# Patient Record
Sex: FEMALE | Race: AMERICAN INDIAN OR ALASKA NATIVE | NOT HISPANIC OR LATINO | RURAL
[De-identification: names, ages, dates, MRNs, and addresses within clinical notes are randomized per-mention and may not be internally consistent; named-entity substitution may affect disease eponyms.]

---

## 2024-05-19 ENCOUNTER — HOSPITAL ENCOUNTER (INPATIENT)
Facility: HOSPITAL | Age: 39
LOS: 4 days | Discharge: HOME | DRG: 432 | End: 2024-05-23
Attending: INTERNAL MEDICINE | Admitting: INTERNAL MEDICINE
Payer: OTHER GOVERNMENT

## 2024-05-19 DIAGNOSIS — K92.0 HEMATEMESIS: ICD-10-CM

## 2024-05-19 DIAGNOSIS — K92.2 UGIB (UPPER GASTROINTESTINAL BLEED): ICD-10-CM

## 2024-05-19 DIAGNOSIS — D61.818 PANCYTOPENIA: Primary | ICD-10-CM

## 2024-05-19 DIAGNOSIS — E11.9 TYPE 2 DIABETES MELLITUS WITHOUT COMPLICATION, WITHOUT LONG-TERM CURRENT USE OF INSULIN: ICD-10-CM

## 2024-05-19 DIAGNOSIS — I10 HYPERTENSION: ICD-10-CM

## 2024-05-19 DIAGNOSIS — I85.00 VARICES OF ESOPHAGUS DETERMINED BY ENDOSCOPY: ICD-10-CM

## 2024-05-19 LAB
ABO + RH BLD: NORMAL
ABORH RETYPE: NORMAL
ALBUMIN SERPL BCP-MCNC: 2.4 G/DL (ref 3.5–5)
ALBUMIN/GLOB SERPL: 0.7 {RATIO}
ALP SERPL-CCNC: 82 U/L (ref 37–98)
ALT SERPL W P-5'-P-CCNC: 23 U/L (ref 13–56)
ANION GAP SERPL CALCULATED.3IONS-SCNC: 7 MMOL/L (ref 7–16)
ANISOCYTOSIS BLD QL SMEAR: ABNORMAL
APTT PPP: 29.4 SECONDS (ref 25.2–37.3)
AST SERPL W P-5'-P-CCNC: 53 U/L (ref 15–37)
BASOPHILS # BLD AUTO: 0.01 K/UL (ref 0–0.2)
BASOPHILS NFR BLD AUTO: 0.3 % (ref 0–1)
BILIRUB SERPL-MCNC: 1.6 MG/DL (ref ?–1.2)
BLD PROD TYP BPU: NORMAL
BLOOD UNIT EXPIRATION DATE: NORMAL
BLOOD UNIT TYPE CODE: 5100
BUN SERPL-MCNC: 7 MG/DL (ref 7–18)
BUN/CREAT SERPL: 12 (ref 6–20)
CALCIUM SERPL-MCNC: 7.4 MG/DL (ref 8.5–10.1)
CHLORIDE SERPL-SCNC: 108 MMOL/L (ref 98–107)
CO2 SERPL-SCNC: 29 MMOL/L (ref 21–32)
CREAT SERPL-MCNC: 0.6 MG/DL (ref 0.55–1.02)
CROSSMATCH INTERPRETATION: NORMAL
DIFFERENTIAL METHOD BLD: ABNORMAL
DISPENSE STATUS: NORMAL
EGFR (NO RACE VARIABLE) (RUSH/TITUS): 117 ML/MIN/1.73M2
EOSINOPHIL # BLD AUTO: 0.01 K/UL (ref 0–0.5)
EOSINOPHIL NFR BLD AUTO: 0.3 % (ref 1–4)
ERYTHROCYTE [DISTWIDTH] IN BLOOD BY AUTOMATED COUNT: 21.9 % (ref 11.5–14.5)
FERRITIN SERPL-MCNC: 19 NG/ML (ref 8–252)
GLOBULIN SER-MCNC: 3.5 G/DL (ref 2–4)
GLUCOSE SERPL-MCNC: 144 MG/DL (ref 74–106)
GLUCOSE SERPL-MCNC: 147 MG/DL (ref 70–105)
HAPTOGLOB SERPL NEPH-MCNC: <8 MG/DL (ref 30–200)
HCG SERPL-ACNC: <1 MIU/ML
HCT VFR BLD AUTO: 20.7 % (ref 38–47)
HGB BLD-MCNC: 5.9 G/DL (ref 12–16)
HIV 1+O+2 AB SERPL QL: NORMAL
HYPOCHROMIA BLD QL SMEAR: ABNORMAL
IMM GRANULOCYTES # BLD AUTO: 0.02 K/UL (ref 0–0.04)
IMM GRANULOCYTES NFR BLD: 0.6 % (ref 0–0.4)
IMM RETICS NFR: 34.9 % (ref 3–15.9)
INDIRECT COOMBS: NORMAL
INR BLD: 1.15
IRON SATN MFR SERPL: 19 % (ref 14–50)
IRON SERPL-MCNC: 61 ΜG/DL (ref 50–170)
LYMPHOCYTES # BLD AUTO: 0.71 K/UL (ref 1–4.8)
LYMPHOCYTES NFR BLD AUTO: 21.2 % (ref 27–41)
MACROCYTES BLD QL SMEAR: ABNORMAL
MAGNESIUM SERPL-MCNC: 1.7 MG/DL (ref 1.7–2.3)
MCH RBC QN AUTO: 29.2 PG (ref 27–31)
MCHC RBC AUTO-ENTMCNC: 28.5 G/DL (ref 32–36)
MCV RBC AUTO: 102.5 FL (ref 80–96)
MONOCYTES # BLD AUTO: 0.26 K/UL (ref 0–0.8)
MONOCYTES NFR BLD AUTO: 7.8 % (ref 2–6)
MPC BLD CALC-MCNC: 10.5 FL (ref 9.4–12.4)
NEUTROPHILS # BLD AUTO: 2.34 K/UL (ref 1.8–7.7)
NEUTROPHILS NFR BLD AUTO: 69.8 % (ref 53–65)
NRBC # BLD AUTO: 0.03 X10E3/UL
NRBC, AUTO (.00): 0.9 %
PLATELET # BLD AUTO: 64 K/UL (ref 150–400)
PLATELET MORPHOLOGY: ABNORMAL
POLYCHROMASIA BLD QL SMEAR: ABNORMAL
POTASSIUM SERPL-SCNC: 3.9 MMOL/L (ref 3.5–5.1)
PROT SERPL-MCNC: 5.9 G/DL (ref 6.4–8.2)
PROTHROMBIN TIME: 14.6 SECONDS (ref 11.7–14.7)
RBC # BLD AUTO: 2.02 M/UL (ref 4.2–5.4)
RETICS # AUTO: 0.18 X10E6/UL (ref 0.02–0.11)
RETICS/RBC NFR AUTO: 8.8 % (ref 0.4–2.2)
RH BLD: NORMAL
SODIUM SERPL-SCNC: 140 MMOL/L (ref 136–145)
SPECIMEN OUTDATE: NORMAL
TIBC SERPL-MCNC: 314 ΜG/DL (ref 250–450)
TSH SERPL DL<=0.005 MIU/L-ACNC: 1.1 UIU/ML (ref 0.36–3.74)
UNIT NUMBER: NORMAL
VIT B12 SERPL-MCNC: 283 PG/ML (ref 193–986)
WBC # BLD AUTO: 3.35 K/UL (ref 4.5–11)

## 2024-05-19 PROCEDURE — 85610 PROTHROMBIN TIME: CPT | Performed by: HOSPITALIST

## 2024-05-19 PROCEDURE — C9113 INJ PANTOPRAZOLE SODIUM, VIA: HCPCS | Performed by: HOSPITALIST

## 2024-05-19 PROCEDURE — 83735 ASSAY OF MAGNESIUM: CPT | Performed by: HOSPITALIST

## 2024-05-19 PROCEDURE — 99223 1ST HOSP IP/OBS HIGH 75: CPT | Mod: ,,, | Performed by: HOSPITALIST

## 2024-05-19 PROCEDURE — 86923 COMPATIBILITY TEST ELECTRIC: CPT | Performed by: HOSPITALIST

## 2024-05-19 PROCEDURE — 85025 COMPLETE CBC W/AUTO DIFF WBC: CPT | Performed by: HOSPITALIST

## 2024-05-19 PROCEDURE — 20000000 HC ICU ROOM

## 2024-05-19 PROCEDURE — 84702 CHORIONIC GONADOTROPIN TEST: CPT | Performed by: INTERNAL MEDICINE

## 2024-05-19 PROCEDURE — 82962 GLUCOSE BLOOD TEST: CPT

## 2024-05-19 PROCEDURE — S4991 NICOTINE PATCH NONLEGEND: HCPCS | Performed by: HOSPITALIST

## 2024-05-19 PROCEDURE — 82728 ASSAY OF FERRITIN: CPT | Performed by: HOSPITALIST

## 2024-05-19 PROCEDURE — 36415 COLL VENOUS BLD VENIPUNCTURE: CPT | Performed by: INTERNAL MEDICINE

## 2024-05-19 PROCEDURE — 63600175 PHARM REV CODE 636 W HCPCS: Performed by: HOSPITALIST

## 2024-05-19 PROCEDURE — 25000003 PHARM REV CODE 250: Performed by: HOSPITALIST

## 2024-05-19 PROCEDURE — P9016 RBC LEUKOCYTES REDUCED: HCPCS | Performed by: HOSPITALIST

## 2024-05-19 PROCEDURE — 85045 AUTOMATED RETICULOCYTE COUNT: CPT | Performed by: HOSPITALIST

## 2024-05-19 PROCEDURE — 82607 VITAMIN B-12: CPT | Performed by: HOSPITALIST

## 2024-05-19 PROCEDURE — 25000003 PHARM REV CODE 250: Performed by: INTERNAL MEDICINE

## 2024-05-19 PROCEDURE — 36415 COLL VENOUS BLD VENIPUNCTURE: CPT | Performed by: HOSPITALIST

## 2024-05-19 PROCEDURE — 85730 THROMBOPLASTIN TIME PARTIAL: CPT | Performed by: HOSPITALIST

## 2024-05-19 PROCEDURE — 84443 ASSAY THYROID STIM HORMONE: CPT | Performed by: HOSPITALIST

## 2024-05-19 PROCEDURE — 93010 ELECTROCARDIOGRAM REPORT: CPT | Mod: ,,, | Performed by: HOSPITALIST

## 2024-05-19 PROCEDURE — 80053 COMPREHEN METABOLIC PANEL: CPT | Performed by: HOSPITALIST

## 2024-05-19 PROCEDURE — 93005 ELECTROCARDIOGRAM TRACING: CPT

## 2024-05-19 PROCEDURE — 83540 ASSAY OF IRON: CPT | Performed by: HOSPITALIST

## 2024-05-19 PROCEDURE — 83010 ASSAY OF HAPTOGLOBIN QUANT: CPT | Performed by: HOSPITALIST

## 2024-05-19 PROCEDURE — 83036 HEMOGLOBIN GLYCOSYLATED A1C: CPT | Mod: 90 | Performed by: INTERNAL MEDICINE

## 2024-05-19 PROCEDURE — 36430 TRANSFUSION BLD/BLD COMPNT: CPT

## 2024-05-19 PROCEDURE — 86850 RBC ANTIBODY SCREEN: CPT | Performed by: HOSPITALIST

## 2024-05-19 PROCEDURE — 87389 HIV-1 AG W/HIV-1&-2 AB AG IA: CPT | Performed by: HOSPITALIST

## 2024-05-19 RX ORDER — INSULIN ASPART 100 [IU]/ML
0-10 INJECTION, SOLUTION INTRAVENOUS; SUBCUTANEOUS EVERY 6 HOURS PRN
Status: DISCONTINUED | OUTPATIENT
Start: 2024-05-19 | End: 2024-05-23 | Stop reason: HOSPADM

## 2024-05-19 RX ORDER — TALC
6 POWDER (GRAM) TOPICAL NIGHTLY PRN
Status: DISCONTINUED | OUTPATIENT
Start: 2024-05-19 | End: 2024-05-23 | Stop reason: HOSPADM

## 2024-05-19 RX ORDER — BISACODYL 5 MG
10 TABLET, DELAYED RELEASE (ENTERIC COATED) ORAL DAILY PRN
Status: DISCONTINUED | OUTPATIENT
Start: 2024-05-19 | End: 2024-05-23 | Stop reason: HOSPADM

## 2024-05-19 RX ORDER — ACETAMINOPHEN 500 MG
1000 TABLET ORAL EVERY 6 HOURS PRN
Status: DISCONTINUED | OUTPATIENT
Start: 2024-05-19 | End: 2024-05-23 | Stop reason: HOSPADM

## 2024-05-19 RX ORDER — GUAIFENESIN AND DEXTROMETHORPHAN HYDROBROMIDE 10; 100 MG/5ML; MG/5ML
10 SYRUP ORAL EVERY 6 HOURS PRN
Status: DISCONTINUED | OUTPATIENT
Start: 2024-05-19 | End: 2024-05-23 | Stop reason: HOSPADM

## 2024-05-19 RX ORDER — PANTOPRAZOLE SODIUM 40 MG/10ML
40 INJECTION, POWDER, LYOPHILIZED, FOR SOLUTION INTRAVENOUS ONCE
Status: COMPLETED | OUTPATIENT
Start: 2024-05-19 | End: 2024-05-19

## 2024-05-19 RX ORDER — METFORMIN HYDROCHLORIDE 500 MG/1
500 TABLET ORAL 2 TIMES DAILY WITH MEALS
COMMUNITY

## 2024-05-19 RX ORDER — IBUPROFEN 200 MG
1 TABLET ORAL DAILY
Status: DISCONTINUED | OUTPATIENT
Start: 2024-05-19 | End: 2024-05-23 | Stop reason: HOSPADM

## 2024-05-19 RX ORDER — GLUCAGON 1 MG
1 KIT INJECTION
Status: DISCONTINUED | OUTPATIENT
Start: 2024-05-19 | End: 2024-05-23 | Stop reason: HOSPADM

## 2024-05-19 RX ORDER — MUPIROCIN 20 MG/G
OINTMENT TOPICAL 2 TIMES DAILY
Status: DISCONTINUED | OUTPATIENT
Start: 2024-05-19 | End: 2024-05-23 | Stop reason: HOSPADM

## 2024-05-19 RX ORDER — HYDROCODONE BITARTRATE AND ACETAMINOPHEN 500; 5 MG/1; MG/1
TABLET ORAL
Status: DISCONTINUED | OUTPATIENT
Start: 2024-05-19 | End: 2024-05-22

## 2024-05-19 RX ORDER — SIMETHICONE 80 MG
1 TABLET,CHEWABLE ORAL 3 TIMES DAILY PRN
Status: DISCONTINUED | OUTPATIENT
Start: 2024-05-19 | End: 2024-05-23 | Stop reason: HOSPADM

## 2024-05-19 RX ORDER — ONDANSETRON HYDROCHLORIDE 2 MG/ML
8 INJECTION, SOLUTION INTRAVENOUS EVERY 6 HOURS PRN
Status: DISCONTINUED | OUTPATIENT
Start: 2024-05-19 | End: 2024-05-23 | Stop reason: HOSPADM

## 2024-05-19 RX ORDER — SODIUM CHLORIDE 9 MG/ML
INJECTION, SOLUTION INTRAVENOUS CONTINUOUS
Status: DISCONTINUED | OUTPATIENT
Start: 2024-05-19 | End: 2024-05-23 | Stop reason: HOSPADM

## 2024-05-19 RX ORDER — TRAZODONE HYDROCHLORIDE 50 MG/1
50 TABLET ORAL NIGHTLY PRN
Status: DISCONTINUED | OUTPATIENT
Start: 2024-05-19 | End: 2024-05-23 | Stop reason: HOSPADM

## 2024-05-19 RX ADMIN — SODIUM CHLORIDE 8 MG/HR: 900 INJECTION INTRAVENOUS at 09:05

## 2024-05-19 RX ADMIN — NICOTINE 1 PATCH: 14 PATCH, EXTENDED RELEASE TRANSDERMAL at 10:05

## 2024-05-19 RX ADMIN — PANTOPRAZOLE SODIUM 40 MG: 40 INJECTION, POWDER, LYOPHILIZED, FOR SOLUTION INTRAVENOUS at 09:05

## 2024-05-19 RX ADMIN — SODIUM CHLORIDE: 9 INJECTION, SOLUTION INTRAVENOUS at 08:05

## 2024-05-19 RX ADMIN — SODIUM CHLORIDE: 9 INJECTION, SOLUTION INTRAVENOUS at 10:05

## 2024-05-19 RX ADMIN — TRAZODONE HYDROCHLORIDE 50 MG: 50 TABLET ORAL at 11:05

## 2024-05-19 RX ADMIN — MUPIROCIN: 20 OINTMENT TOPICAL at 09:05

## 2024-05-20 ENCOUNTER — ANESTHESIA EVENT (OUTPATIENT)
Dept: GASTROENTEROLOGY | Facility: HOSPITAL | Age: 39
DRG: 432 | End: 2024-05-20
Payer: OTHER GOVERNMENT

## 2024-05-20 PROBLEM — R79.89 ELEVATED LIVER FUNCTION TESTS: Status: ACTIVE | Noted: 2024-05-20

## 2024-05-20 LAB
ABO + RH BLD: NORMAL
BASOPHILS # BLD AUTO: 0.01 K/UL (ref 0–0.2)
BASOPHILS NFR BLD AUTO: 0.3 % (ref 0–1)
BILIRUB UR QL STRIP: NEGATIVE
BLD PROD TYP BPU: NORMAL
BLOOD UNIT EXPIRATION DATE: NORMAL
BLOOD UNIT TYPE CODE: 5100
CLARITY UR: CLEAR
COLOR UR: ABNORMAL
CROSSMATCH INTERPRETATION: NORMAL
DIFFERENTIAL METHOD BLD: ABNORMAL
DISPENSE STATUS: NORMAL
EOSINOPHIL # BLD AUTO: 0.05 K/UL (ref 0–0.5)
EOSINOPHIL NFR BLD AUTO: 1.5 % (ref 1–4)
ERYTHROCYTE [DISTWIDTH] IN BLOOD BY AUTOMATED COUNT: 20.7 % (ref 11.5–14.5)
GLUCOSE SERPL-MCNC: 134 MG/DL (ref 70–105)
GLUCOSE SERPL-MCNC: 140 MG/DL (ref 70–105)
GLUCOSE SERPL-MCNC: 157 MG/DL (ref 70–105)
GLUCOSE SERPL-MCNC: 163 MG/DL (ref 70–105)
GLUCOSE UR STRIP-MCNC: NORMAL MG/DL
HCT VFR BLD AUTO: 21.4 % (ref 38–47)
HCT VFR BLD AUTO: 27 % (ref 38–47)
HGB BLD-MCNC: 6.4 G/DL (ref 12–16)
HGB BLD-MCNC: 8 G/DL (ref 12–16)
IGA SER QL IFE: NORMAL
IGA SERPL-MCNC: 331 MG/DL (ref 61–356)
IGA UR QL IFE: NORMAL
IGG SER QL IFE: NORMAL
IGG SERPL-MCNC: 1290 MG/DL (ref 767–1590)
IGG UR QL IFE: NORMAL
IGM SER QL IFE: NORMAL
IGM SERPL-MCNC: 95 MG/DL (ref 37–286)
IGM UR QL IFE: NORMAL
IMM GRANULOCYTES # BLD AUTO: 0.03 K/UL (ref 0–0.04)
IMM GRANULOCYTES NFR BLD: 0.9 % (ref 0–0.4)
KAPPA LC SER QL ELPH: 315 (ref 170–370)
KAPPA LC SER QL IFE: NORMAL
KAPPA LC UR QL IFE: NORMAL
KAPPA LC/LAMBDA SER: 1.85 % (ref 1.35–2.65)
KETONES UR STRIP-SCNC: NEGATIVE MG/DL
LAMBDA IFE, URINE: NORMAL
LAMBDA LC SER QL ELPH: 170 (ref 90–210)
LAMBDA LC SER QL IFE: NORMAL
LDH SERPL-CCNC: 673 U/L (ref 87–241)
LEUKOCYTE ESTERASE UR QL STRIP: NEGATIVE
LYMPHOCYTES # BLD AUTO: 0.87 K/UL (ref 1–4.8)
LYMPHOCYTES NFR BLD AUTO: 26.6 % (ref 27–41)
MCH RBC QN AUTO: 29.4 PG (ref 27–31)
MCHC RBC AUTO-ENTMCNC: 29.9 G/DL (ref 32–36)
MCV RBC AUTO: 98.2 FL (ref 80–96)
MONOCYTES # BLD AUTO: 0.34 K/UL (ref 0–0.8)
MONOCYTES NFR BLD AUTO: 10.4 % (ref 2–6)
MPC BLD CALC-MCNC: 11.5 FL (ref 9.4–12.4)
NEUTROPHILS # BLD AUTO: 1.97 K/UL (ref 1.8–7.7)
NEUTROPHILS NFR BLD AUTO: 60.3 % (ref 53–65)
NITRITE UR QL STRIP: NEGATIVE
NRBC # BLD AUTO: 0.02 X10E3/UL
NRBC, AUTO (.00): 0.6 %
OHS QRS DURATION: 88 MS
OHS QTC CALCULATION: 440 MS
PATH INTERP BLD-IMP: NORMAL
PATHOLOGIST INTERP, IFE, URINE: NORMAL
PH UR STRIP: 7 PH UNITS
PLATELET # BLD AUTO: 53 K/UL (ref 150–400)
PREALB SERPL NEPH-MCNC: 9 MG/DL (ref 20–40)
PROT UR QL STRIP: NEGATIVE
RBC # BLD AUTO: 2.18 M/UL (ref 4.2–5.4)
RBC # UR STRIP: NEGATIVE /UL
SP GR UR STRIP: 1.01
UNIT NUMBER: NORMAL
UROBILINOGEN UR STRIP-ACNC: 6 MG/DL
WBC # BLD AUTO: 3.27 K/UL (ref 4.5–11)

## 2024-05-20 PROCEDURE — C9113 INJ PANTOPRAZOLE SODIUM, VIA: HCPCS | Performed by: INTERNAL MEDICINE

## 2024-05-20 PROCEDURE — 36415 COLL VENOUS BLD VENIPUNCTURE: CPT | Performed by: INTERNAL MEDICINE

## 2024-05-20 PROCEDURE — 83020 HEMOGLOBIN ELECTROPHORESIS: CPT | Performed by: HOSPITALIST

## 2024-05-20 PROCEDURE — 63600175 PHARM REV CODE 636 W HCPCS: Performed by: ANESTHESIOLOGY

## 2024-05-20 PROCEDURE — 86335 IMMUNFIX E-PHORSIS/URINE/CSF: CPT | Mod: 26,,, | Performed by: PATHOLOGY

## 2024-05-20 PROCEDURE — 86335 IMMUNFIX E-PHORSIS/URINE/CSF: CPT | Performed by: HOSPITALIST

## 2024-05-20 PROCEDURE — 84134 ASSAY OF PREALBUMIN: CPT | Performed by: HOSPITALIST

## 2024-05-20 PROCEDURE — 36430 TRANSFUSION BLD/BLD COMPNT: CPT

## 2024-05-20 PROCEDURE — D9220A PRA ANESTHESIA: Mod: ,,, | Performed by: ANESTHESIOLOGY

## 2024-05-20 PROCEDURE — 63600175 PHARM REV CODE 636 W HCPCS: Performed by: HOSPITALIST

## 2024-05-20 PROCEDURE — 63600175 PHARM REV CODE 636 W HCPCS: Mod: JZ,JG | Performed by: NURSE PRACTITIONER

## 2024-05-20 PROCEDURE — 36415 COLL VENOUS BLD VENIPUNCTURE: CPT | Performed by: HOSPITALIST

## 2024-05-20 PROCEDURE — 30233N1 TRANSFUSION OF NONAUTOLOGOUS RED BLOOD CELLS INTO PERIPHERAL VEIN, PERCUTANEOUS APPROACH: ICD-10-PCS | Performed by: INTERNAL MEDICINE

## 2024-05-20 PROCEDURE — 82962 GLUCOSE BLOOD TEST: CPT

## 2024-05-20 PROCEDURE — 43244 EGD VARICES LIGATION: CPT | Performed by: INTERNAL MEDICINE

## 2024-05-20 PROCEDURE — C9113 INJ PANTOPRAZOLE SODIUM, VIA: HCPCS | Performed by: HOSPITALIST

## 2024-05-20 PROCEDURE — 63600175 PHARM REV CODE 636 W HCPCS: Performed by: INTERNAL MEDICINE

## 2024-05-20 PROCEDURE — 86334 IMMUNOFIX E-PHORESIS SERUM: CPT | Performed by: HOSPITALIST

## 2024-05-20 PROCEDURE — 06L38CZ OCCLUSION OF ESOPHAGEAL VEIN WITH EXTRALUMINAL DEVICE, VIA NATURAL OR ARTIFICIAL OPENING ENDOSCOPIC: ICD-10-PCS | Performed by: INTERNAL MEDICINE

## 2024-05-20 PROCEDURE — P9016 RBC LEUKOCYTES REDUCED: HCPCS | Performed by: INTERNAL MEDICINE

## 2024-05-20 PROCEDURE — 85025 COMPLETE CBC W/AUTO DIFF WBC: CPT | Performed by: HOSPITALIST

## 2024-05-20 PROCEDURE — 86923 COMPATIBILITY TEST ELECTRIC: CPT | Performed by: INTERNAL MEDICINE

## 2024-05-20 PROCEDURE — 43244 EGD VARICES LIGATION: CPT | Mod: ,,, | Performed by: INTERNAL MEDICINE

## 2024-05-20 PROCEDURE — 20000000 HC ICU ROOM

## 2024-05-20 PROCEDURE — S4991 NICOTINE PATCH NONLEGEND: HCPCS | Performed by: HOSPITALIST

## 2024-05-20 PROCEDURE — 99223 1ST HOSP IP/OBS HIGH 75: CPT | Mod: 25,,, | Performed by: INTERNAL MEDICINE

## 2024-05-20 PROCEDURE — 25000003 PHARM REV CODE 250: Performed by: INTERNAL MEDICINE

## 2024-05-20 PROCEDURE — 83020 HEMOGLOBIN ELECTROPHORESIS: CPT | Mod: 26,,, | Performed by: PATHOLOGY

## 2024-05-20 PROCEDURE — 81003 URINALYSIS AUTO W/O SCOPE: CPT | Performed by: HOSPITALIST

## 2024-05-20 PROCEDURE — 85018 HEMOGLOBIN: CPT | Performed by: INTERNAL MEDICINE

## 2024-05-20 PROCEDURE — 86334 IMMUNOFIX E-PHORESIS SERUM: CPT | Mod: 26,,, | Performed by: PATHOLOGY

## 2024-05-20 PROCEDURE — 99233 SBSQ HOSP IP/OBS HIGH 50: CPT | Mod: ,,, | Performed by: INTERNAL MEDICINE

## 2024-05-20 PROCEDURE — 25000003 PHARM REV CODE 250: Performed by: ANESTHESIOLOGY

## 2024-05-20 PROCEDURE — 25000003 PHARM REV CODE 250: Performed by: HOSPITALIST

## 2024-05-20 PROCEDURE — 83615 LACTATE (LD) (LDH) ENZYME: CPT | Performed by: HOSPITALIST

## 2024-05-20 RX ORDER — PROPOFOL 10 MG/ML
VIAL (ML) INTRAVENOUS
Status: DISCONTINUED | OUTPATIENT
Start: 2024-05-20 | End: 2024-05-20

## 2024-05-20 RX ORDER — LIDOCAINE HYDROCHLORIDE 20 MG/ML
15 SOLUTION OROPHARYNGEAL ONCE
Status: COMPLETED | OUTPATIENT
Start: 2024-05-20 | End: 2024-05-20

## 2024-05-20 RX ORDER — ALUMINUM HYDROXIDE, MAGNESIUM HYDROXIDE, AND SIMETHICONE 1200; 120; 1200 MG/30ML; MG/30ML; MG/30ML
30 SUSPENSION ORAL EVERY 6 HOURS PRN
Status: DISCONTINUED | OUTPATIENT
Start: 2024-05-20 | End: 2024-05-22

## 2024-05-20 RX ORDER — LIDOCAINE HYDROCHLORIDE 20 MG/ML
INJECTION, SOLUTION EPIDURAL; INFILTRATION; INTRACAUDAL; PERINEURAL
Status: DISCONTINUED | OUTPATIENT
Start: 2024-05-20 | End: 2024-05-20

## 2024-05-20 RX ORDER — FENTANYL CITRATE 50 UG/ML
INJECTION, SOLUTION INTRAMUSCULAR; INTRAVENOUS
Status: DISCONTINUED | OUTPATIENT
Start: 2024-05-20 | End: 2024-05-20

## 2024-05-20 RX ORDER — MORPHINE SULFATE 2 MG/ML
2 INJECTION, SOLUTION INTRAMUSCULAR; INTRAVENOUS ONCE
Status: COMPLETED | OUTPATIENT
Start: 2024-05-20 | End: 2024-05-20

## 2024-05-20 RX ORDER — PANTOPRAZOLE SODIUM 40 MG/10ML
40 INJECTION, POWDER, LYOPHILIZED, FOR SOLUTION INTRAVENOUS 2 TIMES DAILY
Status: DISCONTINUED | OUTPATIENT
Start: 2024-05-20 | End: 2024-05-23 | Stop reason: HOSPADM

## 2024-05-20 RX ORDER — HYDROCODONE BITARTRATE AND ACETAMINOPHEN 500; 5 MG/1; MG/1
TABLET ORAL
Status: DISCONTINUED | OUTPATIENT
Start: 2024-05-20 | End: 2024-05-23 | Stop reason: HOSPADM

## 2024-05-20 RX ORDER — SUCRALFATE 1 G/10ML
1 SUSPENSION ORAL EVERY 6 HOURS
Status: DISCONTINUED | OUTPATIENT
Start: 2024-05-20 | End: 2024-05-23 | Stop reason: HOSPADM

## 2024-05-20 RX ADMIN — PROPOFOL 50 MG: 10 INJECTION, EMULSION INTRAVENOUS at 10:05

## 2024-05-20 RX ADMIN — NICOTINE 1 PATCH: 14 PATCH, EXTENDED RELEASE TRANSDERMAL at 09:05

## 2024-05-20 RX ADMIN — SUCRALFATE 1 G: 1 SUSPENSION ORAL at 11:05

## 2024-05-20 RX ADMIN — SODIUM CHLORIDE 8 MG/HR: 900 INJECTION INTRAVENOUS at 12:05

## 2024-05-20 RX ADMIN — PANTOPRAZOLE SODIUM 40 MG: 40 INJECTION, POWDER, LYOPHILIZED, FOR SOLUTION INTRAVENOUS at 11:05

## 2024-05-20 RX ADMIN — MUPIROCIN: 20 OINTMENT TOPICAL at 08:05

## 2024-05-20 RX ADMIN — SUCRALFATE 1 G: 1 SUSPENSION ORAL at 05:05

## 2024-05-20 RX ADMIN — MUPIROCIN: 20 OINTMENT TOPICAL at 09:05

## 2024-05-20 RX ADMIN — DEXTROSE MONOHYDRATE 1 G: 5 INJECTION INTRAVENOUS at 11:05

## 2024-05-20 RX ADMIN — INSULIN ASPART 1 UNITS: 100 INJECTION, SOLUTION INTRAVENOUS; SUBCUTANEOUS at 11:05

## 2024-05-20 RX ADMIN — SODIUM CHLORIDE: 9 INJECTION, SOLUTION INTRAVENOUS at 09:05

## 2024-05-20 RX ADMIN — INSULIN ASPART 2 UNITS: 100 INJECTION, SOLUTION INTRAVENOUS; SUBCUTANEOUS at 04:05

## 2024-05-20 RX ADMIN — SODIUM CHLORIDE: 9 INJECTION, SOLUTION INTRAVENOUS at 04:05

## 2024-05-20 RX ADMIN — FENTANYL CITRATE 50 MCG: 50 INJECTION INTRAMUSCULAR; INTRAVENOUS at 10:05

## 2024-05-20 RX ADMIN — OCTREOTIDE ACETATE 50 MCG/HR: 500 INJECTION, SOLUTION INTRAVENOUS; SUBCUTANEOUS at 08:05

## 2024-05-20 RX ADMIN — TRAZODONE HYDROCHLORIDE 50 MG: 50 TABLET ORAL at 08:05

## 2024-05-20 RX ADMIN — ACETAMINOPHEN 1000 MG: 500 TABLET ORAL at 01:05

## 2024-05-20 RX ADMIN — LIDOCAINE HYDROCHLORIDE 15 ML: 20 SOLUTION ORAL at 05:05

## 2024-05-20 RX ADMIN — OCTREOTIDE ACETATE 50 MCG/HR: 500 INJECTION, SOLUTION INTRAVENOUS; SUBCUTANEOUS at 11:05

## 2024-05-20 RX ADMIN — LIDOCAINE HYDROCHLORIDE 50 MG: 20 INJECTION, SOLUTION INTRAVENOUS at 10:05

## 2024-05-20 RX ADMIN — ONDANSETRON 8 MG: 2 INJECTION INTRAMUSCULAR; INTRAVENOUS at 02:05

## 2024-05-20 RX ADMIN — SODIUM CHLORIDE: 9 INJECTION, SOLUTION INTRAVENOUS at 06:05

## 2024-05-20 RX ADMIN — SODIUM CHLORIDE 8 MG/HR: 900 INJECTION INTRAVENOUS at 06:05

## 2024-05-20 RX ADMIN — MORPHINE SULFATE 2 MG: 2 INJECTION, SOLUTION INTRAMUSCULAR; INTRAVENOUS at 03:05

## 2024-05-20 RX ADMIN — PANTOPRAZOLE SODIUM 40 MG: 40 INJECTION, POWDER, LYOPHILIZED, FOR SOLUTION INTRAVENOUS at 08:05

## 2024-05-20 NOTE — ASSESSMENT & PLAN NOTE
This is noted with a low white count low hematocrit low platelet count patient has an MCV of 98 has normal B12 level to mildly low normal iron stores with a mildly decreased ferritin.  Haptoglobin is markedly low.  LDH is elevated and bilirubin is L abated could this be hemolysis will send a peripheral smear ask Hematology to evaluate

## 2024-05-20 NOTE — DISCHARGE INSTRUCTIONS
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        Recommendation  Start Octreotide infusion and continue for at least 48h  Use Rocephin or alternatively antibiotic for 5 days for SBP prophylaxis   Stop protonix gtt and switch to BID therapy for 2 weeks then daily  Use Carafate QID for 10 days  Will consider NSBB prior to discharge  Proceed with chronic liver workup  Other recommendations as per consult note same day  Schedule repeat EGD for 1 month to assess for repeat variceal banding

## 2024-05-20 NOTE — ANESTHESIA POSTPROCEDURE EVALUATION
Anesthesia Post Evaluation    Patient: Elizabeth Greenwood    Procedure(s) Performed: *  egd *    Final Anesthesia Type: MAC      Patient location during evaluation: GI PACU  Patient participation: Yes- Able to Participate  Level of consciousness: awake and alert, oriented and awake  Post-procedure vital signs: reviewed and stable  Pain management: adequate  Airway patency: patent    PONV status at discharge: No PONV  Anesthetic complications: no      Cardiovascular status: blood pressure returned to baseline, hemodynamically stable and stable  Respiratory status: unassisted and spontaneous ventilation  Hydration status: euvolemic  Follow-up not needed.              Vitals Value Taken Time   /85 05/20/24 1600   Temp 36.7 °C (98.1 °F) 05/20/24 1534   Pulse 83 05/20/24 1609   Resp 10 05/20/24 1609   SpO2 99 % 05/20/24 1609   Vitals shown include unfiled device data.      Event Time   Out of Recovery 05/20/2024 11:24:15         Pain/Sandi Score: Pain Rating Prior to Med Admin: 5 (5/20/2024  3:34 PM)  Sandi Score: 10 (5/20/2024 11:05 AM)

## 2024-05-20 NOTE — H&P
[facility] Hospital Medicine  History & Physical    Patient Name: Elizabeth Greenwood  MRN: [id number]  Patient Class: IP- Inpatient  Admission Date: 5/19/2024  Attending Physician: Glen Yousif MD   Primary Care Provider: Records, [facility] -         Patient information was obtained from patient, past medical records, and ER records.     Subjective:     Principal Problem:UGIB (upper gastrointestinal bleed)    Chief Complaint: No chief complaint on file.       HPI: 39 yo F transferred from [facility] for hematemesis.  Patient states that she went to bed last night in her usual state of good health and when she woke up this morning she was nauseated.  She vomited several times this morning and went to the [facility] with abdominal pain that was periumbilical but no radiation or alleviating/ exacerbating conditions.  She was given PO ibuprofen and ODT zofran at [facility] ED and states she next vomited coffee ground emesis but she continued to vomit several more times and it turned more of a red color but lighter.  She says that she has never had this problem and has noted no melena or hematechezia.  She does not take daily NSAIDs/steroid not drink alcohol anymore.  She does smoke marijuana but no other illicit drugs.      Her father was recently diagnosed with colon cancer but no other GI malignancies in family history.  Her mother had ovarian cancer and both had T2DM.  She has no abd pain at present and is not nauseated either.  She has had no dysphagia, dyspepsia or unexplained weight loss.  She states that she had some abdominal bloating and distention earlier but this has resolved.      Her CBC shows pancytopenia but her retic count is 8.8 and no coagulopathy noted nor increased bilirubin.    Her HGB 5.6 has prompted a transfusion of pRBC.      Past Medical History:   Diagnosis Date    Nicotine dependence     Type 2 diabetes mellitus         History reviewed. No pertinent surgical history.    Review of patient's allergies indicates:  No Known Allergies    No current facility-administered medications on file prior to encounter.     Current Outpatient Medications on File Prior to Encounter   Medication Sig    metFORMIN (GLUCOPHAGE) 500 MG tablet Take 500 mg by mouth 2 (two) times daily with meals.     Family History       Problem Relation (Age of Onset)    Cancer Mother, Father    Diabetes Mother, Father          Tobacco Use    Smoking status: Every Day     Types: Cigarettes    Smokeless tobacco: Never   Substance and Sexual Activity    Alcohol use: Not Currently    Drug use: Yes     Types: Marijuana    Sexual activity: Not on file     Review of Systems   Constitutional:  Negative for appetite change, chills, fatigue, fever and unexpected weight change.   HENT:  Negative for congestion, mouth sores, nosebleeds, sinus pain, sore throat and trouble swallowing.    Respiratory:  Negative for apnea, cough, chest tightness and shortness of breath.    Cardiovascular:  Negative for chest pain, palpitations and leg swelling.   Gastrointestinal:  Positive for abdominal distention, abdominal pain, nausea and vomiting. Negative for blood in stool, constipation and diarrhea.   Endocrine: Negative for polyuria.   Genitourinary:  Negative for decreased urine volume, difficulty urinating, dysuria and frequency.   Musculoskeletal:  Negative for arthralgias, back pain and neck pain.   Skin:  Negative for rash.   Neurological:  Negative for syncope, light-headedness and headaches.   Hematological:  Does not bruise/bleed easily.   Psychiatric/Behavioral:  Negative for confusion and suicidal ideas.      Objective:     Vital Signs (Most Recent):  Temp: 98.7 °F (37.1 °C) (05/19/24 2301)  Pulse: 100 (05/19/24 2301)  Resp: 14 (05/19/24 2301)  BP: 107/71 (05/19/24 2301)  SpO2: 100 % (05/19/24 2301) Vital Signs (24h Range):  Temp:  [98.2 °F (36.8 °C)-98.7 °F (37.1 °C)] 98.7  °F (37.1 °C)  Pulse:  [] 100  Resp:  [13-22] 14  SpO2:  [99 %-100 %] 100 %  BP: ()/(58-86) 107/71     Weight: 63.2 kg (139 lb 5.3 oz)  Body mass index is 26.33 kg/m².     Physical Exam  Vitals and nursing note reviewed. Exam conducted with a chaperone present.   Constitutional:       Appearance: Normal appearance.   HENT:      Head: Atraumatic.      Mouth/Throat:      Mouth: Mucous membranes are moist.      Pharynx: Oropharynx is clear.   Eyes:      Conjunctiva/sclera: Conjunctivae normal.      Pupils: Pupils are equal, round, and reactive to light.   Neck:      Vascular: No carotid bruit.   Cardiovascular:      Rate and Rhythm: Normal rate and regular rhythm.      Pulses: Normal pulses.      Heart sounds: Murmur heard.   Pulmonary:      Effort: Pulmonary effort is normal.      Breath sounds: Normal breath sounds.   Abdominal:      General: Abdomen is flat. Bowel sounds are normal. There is no distension.      Palpations: Abdomen is soft.      Tenderness: There is no abdominal tenderness. There is no right CVA tenderness, left CVA tenderness, guarding or rebound.      Hernia: No hernia is present.   Musculoskeletal:         General: No deformity or signs of injury. Normal range of motion.      Cervical back: Neck supple.      Right lower leg: No edema.      Left lower leg: No edema.   Skin:     General: Skin is warm and dry.      Capillary Refill: Capillary refill takes less than 2 seconds.      Coloration: Skin is pale. Skin is not jaundiced.      Findings: No bruising, lesion or rash.   Neurological:      General: No focal deficit present.      Mental Status: She is alert and oriented to person, place, and time.   Psychiatric:         Mood and Affect: Mood normal.              CRANIAL NERVES     CN III, IV, VI   Pupils are equal, round, and reactive to light.       Significant Labs: All pertinent labs within the past 24 hours have been reviewed.    Significant Imaging: I have reviewed all pertinent  "imaging results/findings within the past 24 hours.  Assessment/Plan:     * UGIB (upper gastrointestinal bleed)  Patient has acute blood loss due to hemorrhage, the hemorrhage is due to gastrointestinal bleed, patient does not have a propensity for bleeding.. Will trend hemoglobin/hematocrit Daily, as well as monitor and correct for any coagulation defects. CBC and vital signs have been reviewed and last CBC was noted-   Lab Results   Component Value Date    WBC 3.35 (L) 05/19/2024    HGB 5.9 (LL) 05/19/2024    HCT 20.7 (L) 05/19/2024    .5 (H) 05/19/2024    PLT 64 (L) 05/19/2024         Will order a type and screen and consent patient for blood transfusion. Will transfuse if Hgb is <7g/dl (<8g/dl in cases of active ACS) or if patient has rapid bleeding leading to hemodynamic instability.    Protonix bolus and infusion.  NPO and GI consulted to eval for endoscopy.      Pancytopenia  This patient is found to have pancytopenia, the likely etiology is  unkown , will monitor CBC Daily. Will transfuse red blood cells if the hemoglobin is <7g/dL (or <8 in the setting of ACS). Will transfuse platelets if platelet count is <20k. Hold DVT prophylaxis if platelets are <50k. The patient's hemoglobin, white blood cell count, and platelet count results have been reviewed and are listed below.  Recent Labs   Lab 05/19/24 2046   HGB 5.9*   WBC 3.35*   PLT 64*       Complete anemia workup to include HIV LDH haptoglobin, EPEP but may need eval by hematology and bone marrow biposy.        Nicotine dependence  Dangers of cigarette smoking were reviewed with patient in detail. Patient was Counseled for 3-10 minutes. Nicotine replacement options were discussed. Nicotine replacement was discussed- prescribed    Type 2 diabetes mellitus  Patient's FSGs are controlled on current medication regimen.  Last A1c reviewed- No results found for: "LABA1C", "HGBA1C"  Most recent fingerstick glucose reviewed- No results for input(s): " ""POCTGLUCOSE" in the last 24 hours.  Current correctional scale  Medium  Maintain anti-hyperglycemic dose as follows-   Antihyperglycemics (From admission, onward)      Start     Stop Route Frequency Ordered    05/19/24 2251  insulin aspart U-100 injection 0-10 Units         -- SubQ Every 6 hours PRN 05/19/24 2151          Hold Oral hypoglycemics while patient is in the hospital.      VTE Risk Mitigation (From admission, onward)           Ordered     Place YUMIKO hose  Until discontinued         05/19/24 2032                                    Kasie Treadwell MD  Department of Hospital Medicine  [facility]          "

## 2024-05-20 NOTE — PLAN OF CARE
[facility]  Initial Discharge Assessment       Primary Care Provider: Records, [facility] -    Admission Diagnosis: UGIB (upper gastrointestinal bleed) [K92.2]    Admission Date: 5/19/2024  Expected Discharge Date:     Transition of Care Barriers: None    Payor: [facility] / Plan: [facility] INSURANCE / Product Type: Commercial /     Extended Emergency Contact Information  Primary Emergency Contact: libradosam  Mobile Phone: [phone]  Relation: Sister  Preferred language: English   needed? No    Discharge Plan A: Home with family  Discharge Plan B: Home with family    No Pharmacies Listed    Initial Assessment (most recent)       Adult Discharge Assessment - 05/20/24 1431          Discharge Assessment    Assessment Type Discharge Planning Assessment     Source of Information patient     Communicated CHACHA with patient/caregiver Date not available/Unable to determine     People in Home child(deep), dependent     Do you expect to return to your current living situation? Yes     Do you have help at home or someone to help you manage your care at home? No     Prior to hospitilization cognitive status: Alert/Oriented     Current cognitive status: Alert/Oriented     Walking or Climbing Stairs Difficulty no     Dressing/Bathing Difficulty no     Home Accessibility wheelchair accessible     Home Layout Able to live on 1st floor     Equipment Currently Used at Home none     Readmission within 30 days? No     Patient currently being followed by outpatient case management? No     Do you currently have service(s) that help you manage your care at home? No     Do you have prescription coverage? Yes     Coverage [facility]     Do you have any problems affording any of your prescribed medications? No     Is the patient taking medications as prescribed? yes     Are you on dialysis? No     Do you take coumadin? No     Discharge Plan A Home with family      Discharge Plan B Home with family     DME Needed Upon Discharge  none     Discharge Plan discussed with: Patient     Transition of Care Barriers None        Physical Activity    On average, how many days per week do you engage in moderate to strenuous exercise (like a brisk walk)? 0 days     On average, how many minutes do you engage in exercise at this level? 0 min        Financial Resource Strain    How hard is it for you to pay for the very basics like food, housing, medical care, and heating? Not very hard        Housing Stability    In the last 12 months, was there a time when you were not able to pay the mortgage or rent on time? No     At any time in the past 12 months, were you homeless or living in a shelter (including now)? No        Transportation Needs    Has the lack of transportation kept you from medical appointments, meetings, work or from getting things needed for daily living? No        Food Insecurity    Within the past 12 months, you worried that your food would run out before you got the money to buy more. Never true     Within the past 12 months, the food you bought just didn't last and you didn't have money to get more. Never true        Stress    Do you feel stress - tense, restless, nervous, or anxious, or unable to sleep at night because your mind is troubled all the time - these days? Not at all        Social Isolation    How often do you feel lonely or isolated from those around you?  Never        Alcohol Use    Q1: How often do you have a drink containing alcohol? Never     Q2: How many drinks containing alcohol do you have on a typical day when you are drinking? Patient does not drink     Q3: How often do you have six or more drinks on one occasion? Never        Utilities    In the past 12 months has the electric, gas, oil, or water company threatened to shut off services in your home? No        Health Literacy    How often do you need to have someone help you when you read  instructions, pamphlets, or other written material from your doctor or pharmacy? Never                      SS spoke with pt in room. Pt lives at home with her kids. No hh or dme pta. Discharge plan is to return home with no anticipated needs. SS will follow for discharge needs as they arise.

## 2024-05-20 NOTE — ASSESSMENT & PLAN NOTE
Patient has upper GI bleed a wonder for some Gaby-Hunt tear versus peptic ulcer disease currently on Protonix infusion GI has been consulted to evaluate for endoscopy

## 2024-05-20 NOTE — ASSESSMENT & PLAN NOTE
This patient is found to have pancytopenia, the likely etiology is  unkown , will monitor CBC Daily. Will transfuse red blood cells if the hemoglobin is <7g/dL (or <8 in the setting of ACS). Will transfuse platelets if platelet count is <20k. Hold DVT prophylaxis if platelets are <50k. The patient's hemoglobin, white blood cell count, and platelet count results have been reviewed and are listed below.  Recent Labs   Lab 05/19/24  2046   HGB 5.9*   WBC 3.35*   PLT 64*       Complete anemia workup to include HIV LDH haptoglobin, EPEP but may need eval by hematology and bone marrow biposy.

## 2024-05-20 NOTE — ASSESSMENT & PLAN NOTE
"Patient's FSGs are controlled on current medication regimen.  Last A1c reviewed- No results found for: "LABA1C", "HGBA1C"  Most recent fingerstick glucose reviewed- No results for input(s): "POCTGLUCOSE" in the last 24 hours.  Current correctional scale  Medium  Maintain anti-hyperglycemic dose as follows-   Antihyperglycemics (From admission, onward)      Start     Stop Route Frequency Ordered    05/19/24 2251  insulin aspart U-100 injection 0-10 Units         -- SubQ Every 6 hours PRN 05/19/24 2151          Hold Oral hypoglycemics while patient is in the hospital.  "

## 2024-05-20 NOTE — SUBJECTIVE & OBJECTIVE
Past Medical History:   Diagnosis Date    Nicotine dependence     Type 2 diabetes mellitus        History reviewed. No pertinent surgical history.    Review of patient's allergies indicates:  No Known Allergies    No current facility-administered medications on file prior to encounter.     Current Outpatient Medications on File Prior to Encounter   Medication Sig    metFORMIN (GLUCOPHAGE) 500 MG tablet Take 500 mg by mouth 2 (two) times daily with meals.     Family History       Problem Relation (Age of Onset)    Cancer Mother, Father    Diabetes Mother, Father          Tobacco Use    Smoking status: Every Day     Types: Cigarettes    Smokeless tobacco: Never   Substance and Sexual Activity    Alcohol use: Not Currently    Drug use: Yes     Types: Marijuana    Sexual activity: Not on file     Review of Systems   Constitutional:  Negative for appetite change, chills, fatigue, fever and unexpected weight change.   HENT:  Negative for congestion, mouth sores, nosebleeds, sinus pain, sore throat and trouble swallowing.    Respiratory:  Negative for apnea, cough, chest tightness and shortness of breath.    Cardiovascular:  Negative for chest pain, palpitations and leg swelling.   Gastrointestinal:  Positive for abdominal distention, abdominal pain, nausea and vomiting. Negative for blood in stool, constipation and diarrhea.   Endocrine: Negative for polyuria.   Genitourinary:  Negative for decreased urine volume, difficulty urinating, dysuria and frequency.   Musculoskeletal:  Negative for arthralgias, back pain and neck pain.   Skin:  Negative for rash.   Neurological:  Negative for syncope, light-headedness and headaches.   Hematological:  Does not bruise/bleed easily.   Psychiatric/Behavioral:  Negative for confusion and suicidal ideas.      Objective:     Vital Signs (Most Recent):  Temp: 98.7 °F (37.1 °C) (05/19/24 2301)  Pulse: 100 (05/19/24 2301)  Resp: 14 (05/19/24 2301)  BP: 107/71 (05/19/24 2301)  SpO2: 100 %  (05/19/24 2301) Vital Signs (24h Range):  Temp:  [98.2 °F (36.8 °C)-98.7 °F (37.1 °C)] 98.7 °F (37.1 °C)  Pulse:  [] 100  Resp:  [13-22] 14  SpO2:  [99 %-100 %] 100 %  BP: ()/(58-86) 107/71     Weight: 63.2 kg (139 lb 5.3 oz)  Body mass index is 26.33 kg/m².     Physical Exam  Vitals and nursing note reviewed. Exam conducted with a chaperone present.   Constitutional:       Appearance: Normal appearance.   HENT:      Head: Atraumatic.      Mouth/Throat:      Mouth: Mucous membranes are moist.      Pharynx: Oropharynx is clear.   Eyes:      Conjunctiva/sclera: Conjunctivae normal.      Pupils: Pupils are equal, round, and reactive to light.   Neck:      Vascular: No carotid bruit.   Cardiovascular:      Rate and Rhythm: Normal rate and regular rhythm.      Pulses: Normal pulses.      Heart sounds: Murmur heard.   Pulmonary:      Effort: Pulmonary effort is normal.      Breath sounds: Normal breath sounds.   Abdominal:      General: Abdomen is flat. Bowel sounds are normal. There is no distension.      Palpations: Abdomen is soft.      Tenderness: There is no abdominal tenderness. There is no right CVA tenderness, left CVA tenderness, guarding or rebound.      Hernia: No hernia is present.   Musculoskeletal:         General: No deformity or signs of injury. Normal range of motion.      Cervical back: Neck supple.      Right lower leg: No edema.      Left lower leg: No edema.   Skin:     General: Skin is warm and dry.      Capillary Refill: Capillary refill takes less than 2 seconds.      Coloration: Skin is pale. Skin is not jaundiced.      Findings: No bruising, lesion or rash.   Neurological:      General: No focal deficit present.      Mental Status: She is alert and oriented to person, place, and time.   Psychiatric:         Mood and Affect: Mood normal.              CRANIAL NERVES     CN III, IV, VI   Pupils are equal, round, and reactive to light.       Significant Labs: All pertinent labs within the  past 24 hours have been reviewed.    Significant Imaging: I have reviewed all pertinent imaging results/findings within the past 24 hours.

## 2024-05-20 NOTE — PROGRESS NOTES
[facility]  Critical Care Medicine  Progress Note    Patient Name: Elizabeth Greenwood  MRN: [id number]  Admission Date: 5/19/2024  Hospital Length of Stay: 1 days  Code Status: Full Code  Attending Provider: Glen Yousif MD  Primary Care Provider: [facility]   Principal Problem: UGIB (upper gastrointestinal bleed)    Subjective:     HPI:   38-year-old female from [facility] transferred for hematemesis.  She yesterday had nauseated do up several times had abdominal pain periumbilical pain then she started coughing up coffee-ground emesis.  Then there was some bright red blood.  She no longer drinks.  This morning she is resting comfortable without complaints    Hospital/ICU Course:  No notes on file    Interval History/Significant Events:  patient without complaints    Review of Systems  Objective:     Vital Signs (Most Recent):  Temp: 98.2 °F (36.8 °C) (05/20/24 0301)  Pulse: 86 (05/20/24 0600)  Resp: 16 (05/20/24 0600)  BP: 108/79 (05/20/24 0600)  SpO2: 100 % (05/20/24 0600) Vital Signs (24h Range):  Temp:  [98.2 °F (36.8 °C)-98.7 °F (37.1 °C)] 98.2 °F (36.8 °C)  Pulse:  [] 86  Resp:  [13-22] 16  SpO2:  [99 %-100 %] 100 %  BP: ()/(58-86) 108/79   Weight: 63.2 kg (139 lb 5.3 oz)  Body mass index is 26.33 kg/m².      Intake/Output Summary (Last 24 hours) at 5/20/2024 0621  Last data filed at 5/20/2024 0609  Gross per 24 hour   Intake 1491.15 ml   Output --   Net 1491.15 ml          Physical Exam  Vitals reviewed.   Constitutional:       Appearance: Normal appearance.      Interventions: She is not intubated.  HENT:      Head: Normocephalic and atraumatic.      Nose: Nose normal.      Mouth/Throat:      Mouth: Mucous membranes are dry.      Pharynx: Oropharynx is clear.   Eyes:      Extraocular Movements: Extraocular movements intact.      Conjunctiva/sclera: Conjunctivae normal.      Pupils: Pupils are equal, round, and reactive to light.    Cardiovascular:      Rate and Rhythm: Normal rate.      Heart sounds: Normal heart sounds. No murmur heard.  Pulmonary:      Effort: Pulmonary effort is normal. She is not intubated.      Breath sounds: Normal breath sounds.   Abdominal:      General: Abdomen is flat. Bowel sounds are normal.      Palpations: Abdomen is soft.   Musculoskeletal:         General: Normal range of motion.      Cervical back: Normal range of motion and neck supple.      Right lower leg: No edema.      Left lower leg: No edema.   Skin:     General: Skin is warm and dry.      Capillary Refill: Capillary refill takes less than 2 seconds.   Neurological:      General: No focal deficit present.      Mental Status: She is alert and oriented to person, place, and time.   Psychiatric:         Mood and Affect: Mood normal.         Behavior: Behavior normal.            Vents:     Lines/Drains/Airways       Peripheral Intravenous Line  Duration                  Peripheral IV - Double Lumen 05/19/24 2049 20 G Anterior;Left Forearm <1 day         Peripheral IV - Single Lumen 05/19/24 1901 18 G Right Antecubital <1 day                  Significant Labs:    CBC/Anemia Profile:  Recent Labs   Lab 05/19/24 2046 05/20/24 0326   WBC 3.35* 3.27*   HGB 5.9* 6.4*   HCT 20.7* 21.4*   PLT 64* 53*   .5* 98.2*   RDW 21.9* 20.7*   IRON 61  --    FERRITIN 19  --    RETIC 8.8*  --    [id number] 283  --         Chemistries:  Recent Labs   Lab 05/19/24 2046      K 3.9   *   CO2 29   BUN 7   CREATININE 0.60   CALCIUM 7.4*   ALBUMIN 2.4*   PROT 5.9*   BILITOT 1.6*   ALKPHOS 82   ALT 23   AST 53*   MG 1.7       Recent Lab Results  (Last 5 results in the past 24 hours)        05/20/24  0534   05/20/24  0326   05/20/24  0108   05/19/24  2326   05/19/24  2138        ABO and RH         O POS       Appearance, UA     Clear           Baso #   0.01             Basophil %   0.3             Bilirubin (UA)     Negative           Color, UA     Light-Yellow    "        Differential Method   Auto             Eos #   0.05             Eos %   1.5             Glucose, UA     Normal           Hematocrit   21.4             Hemoglobin   6.4             Immature Grans (Abs)   0.03             Immature Granulocytes   0.9             Ketones, UA     Negative           Lactate Dehydrogenase   673             Leukocyte Esterase, UA     Negative           Lymph #   0.87             Lymph %   26.6             MCH   29.4             MCHC   29.9             MCV   98.2             Mono #   0.34             Mono %   10.4             MPV   11.5             Neutrophils, Abs   1.97             Neutrophils Relative   60.3             NITRITE UA     Negative           nRBC   0.6             NUCLEATED RBC ABSOLUTE   0.02             Blood, UA     Negative           pH, UA     7.0           Platelet Count   53             POC Glucose 134       147         Prealbumin   9             Protein, UA     Negative           RBC   2.18             RDW   20.7             Specific [place], UA     1.007           UROBILINOGEN UA     6           WBC   3.27                                    Significant Imaging:  I have reviewed all pertinent imaging results/findings within the past 24 hours.    ABG  No results for input(s): "PH", "PO2", "PCO2", "HCO3", "BE" in the last 168 hours.  Assessment/Plan:     Oncology  Pancytopenia   This is noted with a low white count low hematocrit low platelet count patient has an MCV of 98 has normal B12 level to mildly low normal iron stores with a mildly decreased ferritin.  Haptoglobin is markedly low.  LDH is elevated and bilirubin is L abated could this be hemolysis will send a peripheral smear ask Hematology to evaluate    Endocrine  Type 2 diabetes mellitus   Sugars under good control sliding scale    GI  * UGIB (upper gastrointestinal bleed)   Patient has upper GI bleed a wonder for some Gaby-Hunt tear versus peptic ulcer disease currently on Protonix infusion GI has " been consulted to evaluate for endoscopy    Other  Nicotine dependence   noted             Glen Yousif MD  Critical Care Medicine  [facility]

## 2024-05-20 NOTE — ASSESSMENT & PLAN NOTE
Patient has acute blood loss due to hemorrhage, the hemorrhage is due to gastrointestinal bleed, patient does not have a propensity for bleeding.. Will trend hemoglobin/hematocrit Daily, as well as monitor and correct for any coagulation defects. CBC and vital signs have been reviewed and last CBC was noted-   Lab Results   Component Value Date    WBC 3.35 (L) 05/19/2024    HGB 5.9 (LL) 05/19/2024    HCT 20.7 (L) 05/19/2024    .5 (H) 05/19/2024    PLT 64 (L) 05/19/2024         Will order a type and screen and consent patient for blood transfusion. Will transfuse if Hgb is <7g/dl (<8g/dl in cases of active ACS) or if patient has rapid bleeding leading to hemodynamic instability.    Protonix bolus and infusion.  NPO and GI consulted to eval for endoscopy.

## 2024-05-20 NOTE — CONSULTS
"Gastroenterology Consult Note    Chief Complaint: UGIB (upper gastrointestinal bleed)    Consulted by:   Dr. Treadwell     HPI:  Elizabeth Greenwood is a 38 y.o. [place] female with DM-II that presents with hematemesis. Patient reports a couple of episodes of dark CG emesis yesterday with subsequent ty hematemesis following that which prompted her to proceed to ER. She was transferred to [facility] for further evaluation. She denies prior GIB, NSAIDs, illicits, active alcohol, abdominal pain, rectal bleeding/melena. She does have significant h/o alcohol abuse with 12 beers/day on average for years - reports last drink was ~1 month ago. +FMH CRC in father. No prior endoscopy. Denies known h/o liver disease.       Past Medical History:   Diagnosis Date    Nicotine dependence     Type 2 diabetes mellitus      History reviewed. No pertinent surgical history.  Family History   Problem Relation Name Age of Onset    Cancer Mother      Diabetes Mother      Cancer Father      Diabetes Father         OBJECTIVE:  /81 (BP Location: Right arm, Patient Position: Lying)   Pulse 84   Temp 98 °F (36.7 °C) (Oral)   Resp 14   Ht 5' 1" (1.549 m)   Wt 63.5 kg (140 lb)   LMP 05/01/2024 (Approximate) Comment: 1st week of this month  SpO2 100%   Breastfeeding No   BMI 26.45 kg/m²   GEN: non-toxic appearing, cooperative  HEENT: NCAT, OP benign, MMM; anicteric   NECK: Supple, no LAD  CV: normal rate, regular rhythm  RESP: CTA bilaterally, unlabored  ABD: NABS, distended with fluid wave, NT, soft  EXT: No clubbing, cyanosis, or edema.  SKIN: Warm and dry  NEURO: AAO x4. Afocal. No asterixis    LABS:  CMP  Sodium   Date Value Ref Range Status   05/19/2024 140 136 - 145 mmol/L Final     Potassium   Date Value Ref Range Status   05/19/2024 3.9 3.5 - 5.1 mmol/L Final     Chloride   Date Value Ref Range Status   05/19/2024 108 (H) 98 - 107 mmol/L Final     CO2   Date Value Ref Range Status   05/19/2024 29 21 - 32 mmol/L Final     Glucose "   Date Value Ref Range Status   05/19/2024 144 (H) 74 - 106 mg/dL Final     BUN   Date Value Ref Range Status   05/19/2024 7 7 - 18 mg/dL Final     Creatinine   Date Value Ref Range Status   05/19/2024 0.60 0.55 - 1.02 mg/dL Final     Calcium   Date Value Ref Range Status   05/19/2024 7.4 (L) 8.5 - 10.1 mg/dL Final     Total Protein   Date Value Ref Range Status   05/19/2024 5.9 (L) 6.4 - 8.2 g/dL Final     Albumin   Date Value Ref Range Status   05/19/2024 2.4 (L) 3.5 - 5.0 g/dL Final     Bilirubin, Total   Date Value Ref Range Status   05/19/2024 1.6 (H) >0.0 - 1.2 mg/dL Final     Alk Phos   Date Value Ref Range Status   05/19/2024 82 37 - 98 U/L Final     AST   Date Value Ref Range Status   05/19/2024 53 (H) 15 - 37 U/L Final     ALT   Date Value Ref Range Status   05/19/2024 23 13 - 56 U/L Final     Anion Gap   Date Value Ref Range Status   05/19/2024 7 7 - 16 mmol/L Final     eGFR   Date Value Ref Range Status   05/19/2024 117 >=60 mL/min/1.73m2 Final     Recent Results (from the past 336 hour(s))   CBC with Differential    Collection Time: 05/20/24  3:26 AM   Result Value Ref Range    WBC 3.27 (L) 4.50 - 11.00 K/uL    Hemoglobin 6.4 (L) 12.0 - 16.0 g/dL    Hematocrit 21.4 (L) 38.0 - 47.0 %    Platelet Count 53 (L) 150 - 400 K/uL   CBC with Differential    Collection Time: 05/19/24  8:46 PM   Result Value Ref Range    WBC 3.35 (L) 4.50 - 11.00 K/uL    Hemoglobin 5.9 (LL) 12.0 - 16.0 g/dL    Hematocrit 20.7 (L) 38.0 - 47.0 %    Platelet Count 64 (L) 150 - 400 K/uL     Lab Results   Component Value Date    INR 1.15 05/19/2024     IMAGING:  Imaging reviewed, KUB is unremarkable     ASSESSMENT:    38 y.o. [place] female with DM-II that presents with hematemesis.     PLAN:    Acute UGI Hemorrhage  - CGE, hematemesis, Hgb 5.9, requiring 2u pRBC, EtOH abuse  - agree with monitoring H&H, pRBC as needed, PPI  - PUD vs EVB vs Esophagitis/gastritis vs MWT  - NPO for EGD today       Elevated Liver Enzymes  - AST and Bili  mildly elevated  - known h/o EtOH abuse and suspected fluid wave on exam  - recommend Abdominal US to assess liver and for ascites   - will consider chronic liver workup pending results       Normocytic Anemia  - pancytopenia noted  - Haptoglobin also low - can be seen with cirrhosis   - B12 normal  - check folate      FMH CRC in Father  - will need non-urgent outpatient screening colonoscopy       Thank you for the consult and including me in the care of this patient. Please call me with questions.     Ladarius Valdez MD  Gastroenterology

## 2024-05-20 NOTE — HPI
38-year-old female from [facility] transferred for hematemesis.  She yesterday had nauseated do up several times had abdominal pain periumbilical pain then she started coughing up coffee-ground emesis.  Then there was some bright red blood.  She no longer drinks.  This morning she is resting comfortable without complaints

## 2024-05-20 NOTE — SUBJECTIVE & OBJECTIVE
Interval History/Significant Events:  patient without complaints    Review of Systems  Objective:     Vital Signs (Most Recent):  Temp: 98.2 °F (36.8 °C) (05/20/24 0301)  Pulse: 86 (05/20/24 0600)  Resp: 16 (05/20/24 0600)  BP: 108/79 (05/20/24 0600)  SpO2: 100 % (05/20/24 0600) Vital Signs (24h Range):  Temp:  [98.2 °F (36.8 °C)-98.7 °F (37.1 °C)] 98.2 °F (36.8 °C)  Pulse:  [] 86  Resp:  [13-22] 16  SpO2:  [99 %-100 %] 100 %  BP: ()/(58-86) 108/79   Weight: 63.2 kg (139 lb 5.3 oz)  Body mass index is 26.33 kg/m².      Intake/Output Summary (Last 24 hours) at 5/20/2024 0621  Last data filed at 5/20/2024 0609  Gross per 24 hour   Intake 1491.15 ml   Output --   Net 1491.15 ml          Physical Exam  Vitals reviewed.   Constitutional:       Appearance: Normal appearance.      Interventions: She is not intubated.  HENT:      Head: Normocephalic and atraumatic.      Nose: Nose normal.      Mouth/Throat:      Mouth: Mucous membranes are dry.      Pharynx: Oropharynx is clear.   Eyes:      Extraocular Movements: Extraocular movements intact.      Conjunctiva/sclera: Conjunctivae normal.      Pupils: Pupils are equal, round, and reactive to light.   Cardiovascular:      Rate and Rhythm: Normal rate.      Heart sounds: Normal heart sounds. No murmur heard.  Pulmonary:      Effort: Pulmonary effort is normal. She is not intubated.      Breath sounds: Normal breath sounds.   Abdominal:      General: Abdomen is flat. Bowel sounds are normal.      Palpations: Abdomen is soft.   Musculoskeletal:         General: Normal range of motion.      Cervical back: Normal range of motion and neck supple.      Right lower leg: No edema.      Left lower leg: No edema.   Skin:     General: Skin is warm and dry.      Capillary Refill: Capillary refill takes less than 2 seconds.   Neurological:      General: No focal deficit present.      Mental Status: She is alert and oriented to person, place, and time.   Psychiatric:          Mood and Affect: Mood normal.         Behavior: Behavior normal.            Vents:     Lines/Drains/Airways       Peripheral Intravenous Line  Duration                  Peripheral IV - Double Lumen 05/19/24 2049 20 G Anterior;Left Forearm <1 day         Peripheral IV - Single Lumen 05/19/24 1901 18 G Right Antecubital <1 day                  Significant Labs:    CBC/Anemia Profile:  Recent Labs   Lab 05/19/24 2046 05/20/24  0326   WBC 3.35* 3.27*   HGB 5.9* 6.4*   HCT 20.7* 21.4*   PLT 64* 53*   .5* 98.2*   RDW 21.9* 20.7*   IRON 61  --    FERRITIN 19  --    RETIC 8.8*  --    [id number] 283  --         Chemistries:  Recent Labs   Lab 05/19/24 2046      K 3.9   *   CO2 29   BUN 7   CREATININE 0.60   CALCIUM 7.4*   ALBUMIN 2.4*   PROT 5.9*   BILITOT 1.6*   ALKPHOS 82   ALT 23   AST 53*   MG 1.7       Recent Lab Results  (Last 5 results in the past 24 hours)        05/20/24  0534   05/20/24  0326   05/20/24  0108   05/19/24  2326   05/19/24  2138        ABO and RH         O POS       Appearance, UA     Clear           Baso #   0.01             Basophil %   0.3             Bilirubin (UA)     Negative           Color, UA     Light-Yellow           Differential Method   Auto             Eos #   0.05             Eos %   1.5             Glucose, UA     Normal           Hematocrit   21.4             Hemoglobin   6.4             Immature Grans (Abs)   0.03             Immature Granulocytes   0.9             Ketones, UA     Negative           Lactate Dehydrogenase   673             Leukocyte Esterase, UA     Negative           Lymph #   0.87             Lymph %   26.6             MCH   29.4             MCHC   29.9             MCV   98.2             Mono #   0.34             Mono %   10.4             MPV   11.5             Neutrophils, Abs   1.97             Neutrophils Relative   60.3             NITRITE UA     Negative           nRBC   0.6             NUCLEATED RBC ABSOLUTE   0.02             Blood, UA      Negative           pH, UA     7.0           Platelet Count   53             POC Glucose 134       147         Prealbumin   9             Protein, UA     Negative           RBC   2.18             RDW   20.7             Specific [place], UA     1.007           UROBILINOGEN UA     6           WBC   3.27                                    Significant Imaging:  I have reviewed all pertinent imaging results/findings within the past 24 hours.

## 2024-05-20 NOTE — HPI
39 yo F transferred from [facility] for hematemesis.  Patient states that she went to bed last night in her usual state of good health and when she woke up this morning she was nauseated.  She vomited several times this morning and went to the [facility] with abdominal pain that was periumbilical but no radiation or alleviating/ exacerbating conditions.  She was given PO ibuprofen and ODT zofran at [facility] ED and states she next vomited coffee ground emesis but she continued to vomit several more times and it turned more of a red color but lighter.  She says that she has never had this problem and has noted no melena or hematechezia.  She does not take daily NSAIDs/steroid not drink alcohol anymore.  She does smoke marijuana but no other illicit drugs.      Her father was recently diagnosed with colon cancer but no other GI malignancies in family history.  Her mother had ovarian cancer and both had T2DM.  She has no abd pain at present and is not nauseated either.  She has had no dysphagia, dyspepsia or unexplained weight loss.  She states that she had some abdominal bloating and distention earlier but this has resolved.      Her CBC shows pancytopenia but her retic count is 8.8 and no coagulopathy noted nor increased bilirubin.    Her HGB 5.6 has prompted a transfusion of pRBC.

## 2024-05-20 NOTE — ANESTHESIA PREPROCEDURE EVALUATION
05/20/2024  Elizabeth Greenwood is a 38 y.o., female.      Pre-op Assessment    I have reviewed the Patient Summary Reports.     I have reviewed the Nursing Notes. I have reviewed the NPO Status.   I have reviewed the Medications.     Review of Systems  Social:   Former ETOH abuse      Hematology/Oncology:       -- Anemia:               Hematology Comments: PRBC infusing                    Endocrine:  Diabetes, type 2               Physical Exam  General: Well nourished, Cooperative, Alert and Oriented    Airway:  Mallampati: I   Mouth Opening: Normal    Chest/Lungs:  Clear to auscultation    Heart:  Rate: Normal  Rhythm: Regular Rhythm        Anesthesia Plan  Type of Anesthesia, risks & benefits discussed:    Anesthesia Type: MAC  Intra-op Monitoring Plan: Standard ASA Monitors  Post Op Pain Control Plan: multimodal analgesia  Induction:  IV  Informed Consent: Informed consent signed with the Patient and all parties understand the risks and agree with anesthesia plan.  All questions answered. Patient consented to blood products? Yes  ASA Score: 3  Day of Surgery Review of History & Physical: I have interviewed and examined the patient. I have reviewed the patient's H&P dated: There are no significant changes.     Ready For Surgery From Anesthesia Perspective.     .

## 2024-05-21 LAB
A1AT SERPL NEPH-MCNC: 174 MG/DL (ref 90–200)
AFP-TM SERPL-MCNC: 3.7 NG/ML (ref 0–8)
ANA SER QL: POSITIVE
ANION GAP SERPL CALCULATED.3IONS-SCNC: 7 MMOL/L (ref 7–16)
BASOPHILS # BLD AUTO: 0.01 K/UL (ref 0–0.2)
BASOPHILS NFR BLD AUTO: 0.3 % (ref 0–1)
BUN SERPL-MCNC: 4 MG/DL (ref 7–18)
BUN/CREAT SERPL: 6 (ref 6–20)
CALCIUM SERPL-MCNC: 7.6 MG/DL (ref 8.5–10.1)
CERULOPLASMIN SERPL-MCNC: 30.9 MG/DL (ref 20–60)
CHLORIDE SERPL-SCNC: 108 MMOL/L (ref 98–107)
CO2 SERPL-SCNC: 26 MMOL/L (ref 21–32)
CREAT SERPL-MCNC: 0.64 MG/DL (ref 0.55–1.02)
DIFFERENTIAL METHOD BLD: ABNORMAL
DSDNA IGG SERPL IA-ACNC: 3 IU (ref 0–24)
DSDNA IGG SERPL IA-ACNC: NEGATIVE [IU]/ML
EGFR (NO RACE VARIABLE) (RUSH/TITUS): 115 ML/MIN/1.73M2
ENA AB SER QL IA: 3.8 EUS (ref 0–19.9)
ENA AB SER QL IA: NEGATIVE
EOSINOPHIL # BLD AUTO: 0.04 K/UL (ref 0–0.5)
EOSINOPHIL NFR BLD AUTO: 1.1 % (ref 1–4)
ERYTHROCYTE [DISTWIDTH] IN BLOOD BY AUTOMATED COUNT: 20.6 % (ref 11.5–14.5)
ERYTHROCYTE [DISTWIDTH] IN BLOOD BY AUTOMATED COUNT: 21.6 % (ref 11.5–14.5)
FERRITIN SERPL-MCNC: 28 NG/ML (ref 8–252)
FOLATE SERPL-MCNC: 9.6 NG/ML (ref 3.1–17.5)
GLUCOSE SERPL-MCNC: 133 MG/DL (ref 70–105)
GLUCOSE SERPL-MCNC: 136 MG/DL (ref 74–106)
GLUCOSE SERPL-MCNC: 198 MG/DL (ref 70–105)
GLUCOSE SERPL-MCNC: 96 MG/DL (ref 70–105)
HAV AB SER QL IA: NORMAL
HAV IGM SER QL: NORMAL
HBV CORE IGM SER QL: NORMAL
HBV SURFACE AB SER-ACNC: REACTIVE M[IU]/ML
HBV SURFACE AG SERPL QL IA: NORMAL
HCT VFR BLD AUTO: 21.5 % (ref 38–47)
HCT VFR BLD AUTO: 27.2 % (ref 38–47)
HCV AB SER QL: NORMAL
HGB A1 MFR BLD ELPH: 97.5 % (ref 95.8–98)
HGB A2 MFR BLD ELPH: 2.5 % (ref 2–3.3)
HGB BLD-MCNC: 6.4 G/DL (ref 12–16)
HGB BLD-MCNC: 8.3 G/DL (ref 12–16)
IMM GRANULOCYTES # BLD AUTO: 0.04 K/UL (ref 0–0.04)
IMM GRANULOCYTES NFR BLD: 1.1 % (ref 0–0.4)
LYMPHOCYTES # BLD AUTO: 0.89 K/UL (ref 1–4.8)
LYMPHOCYTES NFR BLD AUTO: 24.5 % (ref 27–41)
MCH RBC QN AUTO: 29.5 PG (ref 27–31)
MCHC RBC AUTO-ENTMCNC: 30.5 G/DL (ref 32–36)
MCV RBC AUTO: 96.8 FL (ref 80–96)
MCV RBC AUTO: 97.3 FL (ref 80–96)
MONOCYTES # BLD AUTO: 0.38 K/UL (ref 0–0.8)
MONOCYTES NFR BLD AUTO: 10.4 % (ref 2–6)
MPC BLD CALC-MCNC: 10.3 FL (ref 9.4–12.4)
NEUTROPHILS # BLD AUTO: 2.28 K/UL (ref 1.8–7.7)
NEUTROPHILS NFR BLD AUTO: 62.6 % (ref 53–65)
NRBC # BLD AUTO: 0 X10E3/UL
NRBC, AUTO (.00): 0 %
PATH INTERP BLD-IMP: ABNORMAL
PLATELET # BLD AUTO: 65 K/UL (ref 150–400)
POTASSIUM SERPL-SCNC: 3.7 MMOL/L (ref 3.5–5.1)
RBC # BLD AUTO: 2.21 M/UL (ref 4.2–5.4)
RBC # BLD AUTO: 2.81 M/UL (ref 4.2–5.4)
SODIUM SERPL-SCNC: 137 MMOL/L (ref 136–145)
WBC # BLD AUTO: 3.64 K/UL (ref 4.5–11)

## 2024-05-21 PROCEDURE — 86704 HEP B CORE ANTIBODY TOTAL: CPT | Mod: 90 | Performed by: INTERNAL MEDICINE

## 2024-05-21 PROCEDURE — 82728 ASSAY OF FERRITIN: CPT | Performed by: INTERNAL MEDICINE

## 2024-05-21 PROCEDURE — 86381 MITOCHONDRIAL ANTIBODY EACH: CPT | Performed by: INTERNAL MEDICINE

## 2024-05-21 PROCEDURE — 86225 DNA ANTIBODY NATIVE: CPT | Performed by: INTERNAL MEDICINE

## 2024-05-21 PROCEDURE — 20000000 HC ICU ROOM

## 2024-05-21 PROCEDURE — 99233 SBSQ HOSP IP/OBS HIGH 50: CPT | Mod: ,,, | Performed by: INTERNAL MEDICINE

## 2024-05-21 PROCEDURE — 36415 COLL VENOUS BLD VENIPUNCTURE: CPT | Performed by: INTERNAL MEDICINE

## 2024-05-21 PROCEDURE — 82103 ALPHA-1-ANTITRYPSIN TOTAL: CPT | Performed by: INTERNAL MEDICINE

## 2024-05-21 PROCEDURE — 25000003 PHARM REV CODE 250: Performed by: INTERNAL MEDICINE

## 2024-05-21 PROCEDURE — 80074 ACUTE HEPATITIS PANEL: CPT | Performed by: INTERNAL MEDICINE

## 2024-05-21 PROCEDURE — 85025 COMPLETE CBC W/AUTO DIFF WBC: CPT | Performed by: INTERNAL MEDICINE

## 2024-05-21 PROCEDURE — 86708 HEPATITIS A ANTIBODY: CPT | Performed by: INTERNAL MEDICINE

## 2024-05-21 PROCEDURE — 63600175 PHARM REV CODE 636 W HCPCS: Performed by: NURSE PRACTITIONER

## 2024-05-21 PROCEDURE — 80048 BASIC METABOLIC PNL TOTAL CA: CPT | Performed by: INTERNAL MEDICINE

## 2024-05-21 PROCEDURE — 86015 ACTIN ANTIBODY EACH: CPT | Mod: 90 | Performed by: INTERNAL MEDICINE

## 2024-05-21 PROCEDURE — 82105 ALPHA-FETOPROTEIN SERUM: CPT | Performed by: INTERNAL MEDICINE

## 2024-05-21 PROCEDURE — 63600175 PHARM REV CODE 636 W HCPCS: Performed by: HOSPITALIST

## 2024-05-21 PROCEDURE — 82962 GLUCOSE BLOOD TEST: CPT

## 2024-05-21 PROCEDURE — 25000003 PHARM REV CODE 250: Performed by: NURSE PRACTITIONER

## 2024-05-21 PROCEDURE — 82746 ASSAY OF FOLIC ACID SERUM: CPT | Performed by: INTERNAL MEDICINE

## 2024-05-21 PROCEDURE — 82390 ASSAY OF CERULOPLASMIN: CPT | Performed by: INTERNAL MEDICINE

## 2024-05-21 PROCEDURE — 86706 HEP B SURFACE ANTIBODY: CPT | Performed by: INTERNAL MEDICINE

## 2024-05-21 PROCEDURE — 86235 NUCLEAR ANTIGEN ANTIBODY: CPT | Performed by: INTERNAL MEDICINE

## 2024-05-21 PROCEDURE — 63600175 PHARM REV CODE 636 W HCPCS: Mod: JA | Performed by: INTERNAL MEDICINE

## 2024-05-21 PROCEDURE — 86038 ANTINUCLEAR ANTIBODIES: CPT | Performed by: INTERNAL MEDICINE

## 2024-05-21 PROCEDURE — 25000003 PHARM REV CODE 250: Performed by: HOSPITALIST

## 2024-05-21 PROCEDURE — C9113 INJ PANTOPRAZOLE SODIUM, VIA: HCPCS | Performed by: INTERNAL MEDICINE

## 2024-05-21 RX ORDER — PROCHLORPERAZINE EDISYLATE 5 MG/ML
2.5 INJECTION INTRAMUSCULAR; INTRAVENOUS EVERY 6 HOURS PRN
Status: DISCONTINUED | OUTPATIENT
Start: 2024-05-21 | End: 2024-05-23 | Stop reason: HOSPADM

## 2024-05-21 RX ADMIN — PANTOPRAZOLE SODIUM 40 MG: 40 INJECTION, POWDER, LYOPHILIZED, FOR SOLUTION INTRAVENOUS at 07:05

## 2024-05-21 RX ADMIN — OCTREOTIDE ACETATE 50 MCG/HR: 500 INJECTION, SOLUTION INTRAVENOUS; SUBCUTANEOUS at 11:05

## 2024-05-21 RX ADMIN — SODIUM CHLORIDE: 9 INJECTION, SOLUTION INTRAVENOUS at 05:05

## 2024-05-21 RX ADMIN — PANTOPRAZOLE SODIUM 40 MG: 40 INJECTION, POWDER, LYOPHILIZED, FOR SOLUTION INTRAVENOUS at 08:05

## 2024-05-21 RX ADMIN — MUPIROCIN: 20 OINTMENT TOPICAL at 09:05

## 2024-05-21 RX ADMIN — ONDANSETRON 8 MG: 2 INJECTION INTRAMUSCULAR; INTRAVENOUS at 07:05

## 2024-05-21 RX ADMIN — PROCHLORPERAZINE EDISYLATE 2.5 MG: 5 INJECTION INTRAMUSCULAR; INTRAVENOUS at 12:05

## 2024-05-21 RX ADMIN — OCTREOTIDE ACETATE 50 MCG/HR: 500 INJECTION, SOLUTION INTRAVENOUS; SUBCUTANEOUS at 02:05

## 2024-05-21 RX ADMIN — SUCRALFATE 1 G: 1 SUSPENSION ORAL at 11:05

## 2024-05-21 RX ADMIN — SUCRALFATE 1 G: 1 SUSPENSION ORAL at 05:05

## 2024-05-21 RX ADMIN — SODIUM CHLORIDE: 9 INJECTION, SOLUTION INTRAVENOUS at 01:05

## 2024-05-21 RX ADMIN — PROMETHAZINE HYDROCHLORIDE 12.5 MG: 25 INJECTION INTRAMUSCULAR; INTRAVENOUS at 09:05

## 2024-05-21 RX ADMIN — OCTREOTIDE ACETATE 50 MCG/HR: 500 INJECTION, SOLUTION INTRAVENOUS; SUBCUTANEOUS at 04:05

## 2024-05-21 RX ADMIN — INSULIN ASPART 2 UNITS: 100 INJECTION, SOLUTION INTRAVENOUS; SUBCUTANEOUS at 12:05

## 2024-05-21 RX ADMIN — MUPIROCIN: 20 OINTMENT TOPICAL at 08:05

## 2024-05-21 NOTE — PLAN OF CARE
Problem: Diabetes Comorbidity  Goal: Blood Glucose Level Within Targeted Range  Outcome: Progressing  Intervention: Monitor and Manage Glycemia  Flowsheets (Taken 5/21/2024 1845)  Glycemic Management: blood glucose monitored     Problem: Fall Injury Risk  Goal: Absence of Fall and Fall-Related Injury  Outcome: Progressing  Intervention: Identify and Manage Contributors  Flowsheets (Taken 5/21/2024 1845)  Self-Care Promotion: adaptive equipment use encouraged  Medication Review/Management: medications reviewed  Intervention: Promote Injury-Free Environment  Flowsheets (Taken 5/21/2024 1845)  Safety Promotion/Fall Prevention:   assistive device/personal item within reach   room near unit station   high risk medications identified

## 2024-05-21 NOTE — ASSESSMENT & PLAN NOTE
Patient had esophageal varices is receiving octreotide will discuss with GI when we can stop that overall patient is better

## 2024-05-21 NOTE — PROGRESS NOTES
[facility]  Critical Care Medicine  Progress Note    Patient Name: Elizabeth Greenwood  MRN: [id number]  Admission Date: 5/19/2024  Hospital Length of Stay: 2 days  Code Status: Full Code  Attending Provider: Glen Yousif MD  Primary Care Provider: [facility]   Principal Problem: UGIB (upper gastrointestinal bleed)    Subjective:     HPI:   38-year-old female from [facility] transferred for hematemesis.  She yesterday had nauseated do up several times had abdominal pain periumbilical pain then she started coughing up coffee-ground emesis.  Then there was some bright red blood.  She no longer drinks.  This morning she is resting comfortable without complaints    Hospital/ICU Course:  No notes on file    Interval History/Significant Events:  patient without complaints this morning resting comfortably    Review of Systems  Objective:     Vital Signs (Most Recent):  Temp: 98.6 °F (37 °C) (05/21/24 0307)  Pulse: 86 (05/21/24 0305)  Resp: (!) 24 (05/21/24 0305)  BP: 128/86 (05/21/24 0307)  SpO2: 99 % (05/21/24 0305) Vital Signs (24h Range):  Temp:  [98 °F (36.7 °C)-98.7 °F (37.1 °C)] 98.6 °F (37 °C)  Pulse:  [] 86  Resp:  [10-24] 24  SpO2:  [96 %-100 %] 99 %  BP: (103-182)/() 128/86   Weight: 66.6 kg (146 lb 13.2 oz)  Body mass index is 27.74 kg/m².      Intake/Output Summary (Last 24 hours) at 5/21/2024 0626  Last data filed at 5/21/2024 0500  Gross per 24 hour   Intake 2686.8 ml   Output --   Net 2686.8 ml          Physical Exam  Vitals reviewed.   Constitutional:       Appearance: Normal appearance.      Interventions: She is not intubated.  HENT:      Head: Normocephalic and atraumatic.      Nose: Nose normal.      Mouth/Throat:      Mouth: Mucous membranes are dry.      Pharynx: Oropharynx is clear.   Eyes:      Extraocular Movements: Extraocular movements intact.      Conjunctiva/sclera: Conjunctivae normal.      Pupils: Pupils are equal, round,  and reactive to light.   Cardiovascular:      Rate and Rhythm: Normal rate.      Heart sounds: Normal heart sounds. No murmur heard.  Pulmonary:      Effort: Pulmonary effort is normal. She is not intubated.      Breath sounds: Normal breath sounds.   Abdominal:      General: Abdomen is flat. Bowel sounds are normal.      Palpations: Abdomen is soft.   Musculoskeletal:         General: Normal range of motion.      Cervical back: Normal range of motion and neck supple.      Right lower leg: No edema.      Left lower leg: No edema.   Skin:     General: Skin is warm and dry.      Capillary Refill: Capillary refill takes less than 2 seconds.   Neurological:      General: No focal deficit present.      Mental Status: She is alert and oriented to person, place, and time.   Psychiatric:         Mood and Affect: Mood normal.         Behavior: Behavior normal.            Vents:     Lines/Drains/Airways       Peripheral Intravenous Line  Duration                  Peripheral IV - Double Lumen 05/19/24 2049 20 G Anterior;Left Forearm 1 day         Peripheral IV - Single Lumen 05/19/24 1901 18 G Right Antecubital 1 day                  Significant Labs:    CBC/Anemia Profile:  Recent Labs   Lab 05/19/24  2046 05/20/24  0326 05/20/24  1212   WBC 3.35* 3.27*  --    HGB 5.9* 6.4* 8.0*   HCT 20.7* 21.4* 27.0*   PLT 64* 53*  --    .5* 98.2*  --    RDW 21.9* 20.7*  --    IRON 61  --   --    FERRITIN 19  --   --    RETIC 8.8*  --   --    [id number] 283  --   --         Chemistries:  Recent Labs   Lab 05/19/24  2046      K 3.9   *   CO2 29   BUN 7   CREATININE 0.60   CALCIUM 7.4*   ALBUMIN 2.4*   PROT 5.9*   BILITOT 1.6*   ALKPHOS 82   ALT 23   AST 53*   MG 1.7       Recent Lab Results  (Last 5 results in the past 24 hours)        05/21/24  0526   05/20/24  2323   05/20/24  1641   05/20/24  1212   05/20/24  1129        Hematocrit       27.0         Hemoglobin       8.0         POC Glucose 133   163   157     140        "                       Significant Imaging:  I have reviewed all pertinent imaging results/findings within the past 24 hours.    ABG  No results for input(s): "PH", "PO2", "PCO2", "HCO3", "BE" in the last 168 hours.  Assessment/Plan:     Oncology  Pancytopenia   Certainly appreciate Dr. Goyal  evaluation probably this is all related to chronic illness in alcohol use    Endocrine  Type 2 diabetes mellitus   Sugars under good control sliding scale    GI  * UGIB (upper gastrointestinal bleed)   Patient had esophageal varices is receiving octreotide will discuss with GI when we can stop that overall patient is better    Elevated liver function tests   This most likely represents cirrhosis lab workup in process    Other  Nicotine dependence   noted        .     Glen Yousif MD  Critical Care Medicine  [facility]  "

## 2024-05-21 NOTE — ASSESSMENT & PLAN NOTE
Certainly appreciate Dr. Goyal  evaluation probably this is all related to chronic illness in alcohol use

## 2024-05-21 NOTE — PROGRESS NOTES
Asked to see patient regarding abnormal labs with Pancytopenia. Clinical history is relevant for heavy alcohol intake Stopping only around four weeks prior. Ultrasound from today confirmed cirrhosis as well as splenomegaly. Patient was seen in ICU room six and was pleasant and cooperative. She seems to have mild to moderate ascites at this time and she states this was not present on admission. Some of this may be related to Aggressive IV hydration.  No leg edema is noted and mild nausea is present.    She underwent EGD today with several grade 3 varies noted. Coffee ground emesis and abdominal pain were among her presenting chief complaints    HIV is negative along with normal B12 and serum protein electrophoresis   Hemoglobin electrolysis is pending, but will likely not be helpful.    Reticulocytes are mildly elevated. Iron levels are lower limits of normal based on ferritin. Her haptoglobin is low but clinically this is more consistent with liver disease and not homolysis.    I will update hepatitis panel. Recommended to decrease IV fluids and she should be able to transfer to regular room fairly soon. At this point , I do not believe she needs outpatient hematology follow up. Risks of further drinking discussed with Patient.

## 2024-05-21 NOTE — PLAN OF CARE
Problem: Adult Inpatient Plan of Care  Goal: Plan of Care Review  Outcome: Progressing  Goal: Patient-Specific Goal (Individualized)  Outcome: Progressing  Goal: Absence of Hospital-Acquired Illness or Injury  Outcome: Progressing  Goal: Optimal Comfort and Wellbeing  Outcome: Progressing  Goal: Readiness for Transition of Care  Outcome: Progressing     Problem: Diabetes Comorbidity  Goal: Blood Glucose Level Within Targeted Range  Outcome: Progressing  Intervention: Monitor and Manage Glycemia  Flowsheets (Taken 5/20/2024 1902)  Glycemic Management: blood glucose monitored

## 2024-05-21 NOTE — SUBJECTIVE & OBJECTIVE
Interval History/Significant Events:  patient without complaints this morning resting comfortably    Review of Systems  Objective:     Vital Signs (Most Recent):  Temp: 98.6 °F (37 °C) (05/21/24 0307)  Pulse: 86 (05/21/24 0305)  Resp: (!) 24 (05/21/24 0305)  BP: 128/86 (05/21/24 0307)  SpO2: 99 % (05/21/24 0305) Vital Signs (24h Range):  Temp:  [98 °F (36.7 °C)-98.7 °F (37.1 °C)] 98.6 °F (37 °C)  Pulse:  [] 86  Resp:  [10-24] 24  SpO2:  [96 %-100 %] 99 %  BP: (103-182)/() 128/86   Weight: 66.6 kg (146 lb 13.2 oz)  Body mass index is 27.74 kg/m².      Intake/Output Summary (Last 24 hours) at 5/21/2024 0626  Last data filed at 5/21/2024 0500  Gross per 24 hour   Intake 2686.8 ml   Output --   Net 2686.8 ml          Physical Exam  Vitals reviewed.   Constitutional:       Appearance: Normal appearance.      Interventions: She is not intubated.  HENT:      Head: Normocephalic and atraumatic.      Nose: Nose normal.      Mouth/Throat:      Mouth: Mucous membranes are dry.      Pharynx: Oropharynx is clear.   Eyes:      Extraocular Movements: Extraocular movements intact.      Conjunctiva/sclera: Conjunctivae normal.      Pupils: Pupils are equal, round, and reactive to light.   Cardiovascular:      Rate and Rhythm: Normal rate.      Heart sounds: Normal heart sounds. No murmur heard.  Pulmonary:      Effort: Pulmonary effort is normal. She is not intubated.      Breath sounds: Normal breath sounds.   Abdominal:      General: Abdomen is flat. Bowel sounds are normal.      Palpations: Abdomen is soft.   Musculoskeletal:         General: Normal range of motion.      Cervical back: Normal range of motion and neck supple.      Right lower leg: No edema.      Left lower leg: No edema.   Skin:     General: Skin is warm and dry.      Capillary Refill: Capillary refill takes less than 2 seconds.   Neurological:      General: No focal deficit present.      Mental Status: She is alert and oriented to person, place, and  time.   Psychiatric:         Mood and Affect: Mood normal.         Behavior: Behavior normal.            Vents:     Lines/Drains/Airways       Peripheral Intravenous Line  Duration                  Peripheral IV - Double Lumen 05/19/24 2049 20 G Anterior;Left Forearm 1 day         Peripheral IV - Single Lumen 05/19/24 1901 18 G Right Antecubital 1 day                  Significant Labs:    CBC/Anemia Profile:  Recent Labs   Lab 05/19/24 2046 05/20/24  0326 05/20/24  1212   WBC 3.35* 3.27*  --    HGB 5.9* 6.4* 8.0*   HCT 20.7* 21.4* 27.0*   PLT 64* 53*  --    .5* 98.2*  --    RDW 21.9* 20.7*  --    IRON 61  --   --    FERRITIN 19  --   --    RETIC 8.8*  --   --    [id number] 283  --   --         Chemistries:  Recent Labs   Lab 05/19/24 2046      K 3.9   *   CO2 29   BUN 7   CREATININE 0.60   CALCIUM 7.4*   ALBUMIN 2.4*   PROT 5.9*   BILITOT 1.6*   ALKPHOS 82   ALT 23   AST 53*   MG 1.7       Recent Lab Results  (Last 5 results in the past 24 hours)        05/21/24  0526   05/20/24  2323   05/20/24  1641   05/20/24  1212   05/20/24  1129        Hematocrit       27.0         Hemoglobin       8.0         POC Glucose 133   163   157     140                              Significant Imaging:  I have reviewed all pertinent imaging results/findings within the past 24 hours.

## 2024-05-21 NOTE — PROGRESS NOTES
"Gastroenterology Consult Note    Chief Complaint: UGIB (upper gastrointestinal bleed)    Consulted by:   Dr. Treadwell     HPI:  Elizabeth Greenwood is a 38 y.o. [place] female with DM-II that presents with hematemesis. Patient reports a couple of episodes of dark CG emesis yesterday with subsequent ty hematemesis following that which prompted her to proceed to ER. She was transferred to [facility] for further evaluation. She denies prior GIB, NSAIDs, illicits, active alcohol, abdominal pain, rectal bleeding/melena. She does have significant h/o alcohol abuse with 12 beers/day on average for years - reports last drink was ~1 month ago. +[facility] CRC in father. No prior endoscopy. Denies known h/o liver disease.     INTERVAL  - doing well, no further overt bleeding  - has had some nausea and pain s/p banding but improving  - long discussion about cirrhosis and need to stop all alcohol use      Past Medical History:   Diagnosis Date    Alcohol dependence     Esophageal varices with bleeding in diseases classified elsewhere     Marijuana dependence     Nicotine dependence     Type 2 diabetes mellitus      History reviewed. No pertinent surgical history.  Family History   Problem Relation Name Age of Onset    Cancer Mother      Diabetes Mother      Cancer Father      Diabetes Father         OBJECTIVE:  BP (!) 136/91   Pulse 92   Temp 98.5 °F (36.9 °C) (Oral)   Resp 14   Ht 5' 1" (1.549 m)   Wt 66.6 kg (146 lb 13.2 oz)   LMP 05/01/2024 (Approximate) Comment: 1st week of this month  SpO2 98%   Breastfeeding No   BMI 27.74 kg/m²   GEN: non-toxic appearing, cooperative  HEENT: NCAT, OP benign, MMM; anicteric   NECK: Supple, no LAD  CV: normal rate, regular rhythm  RESP: CTA bilaterally, unlabored  ABD: NABS, distended with fluid wave, NT, soft  EXT: No clubbing, cyanosis, or edema.  SKIN: Warm and dry  NEURO: AAO x4. Afocal. No asterixis    LABS:  CMP  Sodium   Date Value Ref Range Status   05/21/2024 137 136 - 145 mmol/L " Final     Potassium   Date Value Ref Range Status   05/21/2024 3.7 3.5 - 5.1 mmol/L Final     Chloride   Date Value Ref Range Status   05/21/2024 108 (H) 98 - 107 mmol/L Final     CO2   Date Value Ref Range Status   05/21/2024 26 21 - 32 mmol/L Final     Glucose   Date Value Ref Range Status   05/21/2024 136 (H) 74 - 106 mg/dL Final     BUN   Date Value Ref Range Status   05/21/2024 4 (L) 7 - 18 mg/dL Final     Creatinine   Date Value Ref Range Status   05/21/2024 0.64 0.55 - 1.02 mg/dL Final     Calcium   Date Value Ref Range Status   05/21/2024 7.6 (L) 8.5 - 10.1 mg/dL Final     Total Protein   Date Value Ref Range Status   05/19/2024 5.9 (L) 6.4 - 8.2 g/dL Final     Albumin   Date Value Ref Range Status   05/19/2024 2.4 (L) 3.5 - 5.0 g/dL Final     Bilirubin, Total   Date Value Ref Range Status   05/19/2024 1.6 (H) >0.0 - 1.2 mg/dL Final     Alk Phos   Date Value Ref Range Status   05/19/2024 82 37 - 98 U/L Final     AST   Date Value Ref Range Status   05/19/2024 53 (H) 15 - 37 U/L Final     ALT   Date Value Ref Range Status   05/19/2024 23 13 - 56 U/L Final     Anion Gap   Date Value Ref Range Status   05/21/2024 7 7 - 16 mmol/L Final     eGFR   Date Value Ref Range Status   05/21/2024 115 >=60 mL/min/1.73m2 Final     Recent Results (from the past 336 hour(s))   CBC with Differential    Collection Time: 05/21/24  6:39 AM   Result Value Ref Range    WBC 3.64 (L) 4.50 - 11.00 K/uL    Hemoglobin 8.3 (L) 12.0 - 16.0 g/dL    Hematocrit 27.2 (L) 38.0 - 47.0 %    Platelet Count 65 (L) 150 - 400 K/uL   CBC with Differential    Collection Time: 05/20/24  3:26 AM   Result Value Ref Range    WBC 3.27 (L) 4.50 - 11.00 K/uL    Hemoglobin 6.4 (L) 12.0 - 16.0 g/dL    Hematocrit 21.4 (L) 38.0 - 47.0 %    Platelet Count 53 (L) 150 - 400 K/uL   CBC with Differential    Collection Time: 05/19/24  8:46 PM   Result Value Ref Range    WBC 3.35 (L) 4.50 - 11.00 K/uL    Hemoglobin 5.9 (LL) 12.0 - 16.0 g/dL    Hematocrit 20.7 (L) 38.0 -  47.0 %    Platelet Count 64 (L) 150 - 400 K/uL     Lab Results   Component Value Date    INR 1.15 05/19/2024     IMAGING:  Imaging reviewed, KUB is unremarkable     ASSESSMENT:    38 y.o. [place] female with DM-II that presents with hematemesis.     PLAN:    Acute Variceal Hemorrhage  - s/p EGD with EVL x4   - continue antibiotic SBP prophylaxis for total of 5 days  - recommend PPI BID for 2 weeks then daily  - continue carafate QID for 10 days   - OK to stop Octreotide tomorrow    - when stopping Octreotide, recommend starting Nadolol 20 mg daily and continuing at discharge   - OK to advance to full liquids tomorrow morning if remains stable - then as tolerated on Thrs  - follow up in GI clinic with YASMINE Rodriguez in 3 months   - I will see patient when she comes for EGD in 1 month      Decompensated (EVB) Cirrhosis   - Etiology: likely alcohol   - Chronic liver w/u ordered: notable for +INO; remainder negative thus far   - Vaccinations: HBV immune; needs HAV vaccination   - MELD: 13  - Ascites: small   - recommend diagnostic paracentesis if there is enough fluid for sampling   - please obtain cell count with diff, Albumin, Total protein, cytology, culture    - Varices: yes, grade III EV s/p banding; repeat EGD 1 month  - Encephalopathy: none  - HCC Surveilance: no HCC on US 5/2024; AFP 3.7  - Transplant candidate: unclear; low MELD at this time; long discussion - advised EtOH cessation       [facility] CRC in Father  - will need non-urgent outpatient screening colonoscopy       Thank you for the consult and including me in the care of this patient. Dr. Garduno is assuming inpatient coverage tomorrow - please notify the on-call GI physician if there are any questions prior to discharge.     aLdarius Valdez MD  Gastroenterology

## 2024-05-22 LAB
ALBUMIN SERPL BCP-MCNC: 2.4 G/DL (ref 3.5–5)
ALBUMIN/GLOB SERPL: 0.7 {RATIO}
ALP SERPL-CCNC: 77 U/L (ref 37–98)
ALT SERPL W P-5'-P-CCNC: 25 U/L (ref 13–56)
ANION GAP SERPL CALCULATED.3IONS-SCNC: 6 MMOL/L (ref 7–16)
AST SERPL W P-5'-P-CCNC: 54 U/L (ref 15–37)
BASOPHILS # BLD AUTO: 0.01 K/UL (ref 0–0.2)
BASOPHILS NFR BLD AUTO: 0.3 % (ref 0–1)
BILIRUB SERPL-MCNC: 2.1 MG/DL (ref ?–1.2)
BUN SERPL-MCNC: 6 MG/DL (ref 7–18)
BUN/CREAT SERPL: 9 (ref 6–20)
CALCIUM SERPL-MCNC: 7.5 MG/DL (ref 8.5–10.1)
CHLORIDE SERPL-SCNC: 110 MMOL/L (ref 98–107)
CO2 SERPL-SCNC: 25 MMOL/L (ref 21–32)
CREAT SERPL-MCNC: 0.64 MG/DL (ref 0.55–1.02)
DIFFERENTIAL METHOD BLD: ABNORMAL
EGFR (NO RACE VARIABLE) (RUSH/TITUS): 115 ML/MIN/1.73M2
EOSINOPHIL # BLD AUTO: 0.03 K/UL (ref 0–0.5)
EOSINOPHIL NFR BLD AUTO: 1 % (ref 1–4)
ERYTHROCYTE [DISTWIDTH] IN BLOOD BY AUTOMATED COUNT: 21.5 % (ref 11.5–14.5)
GLOBULIN SER-MCNC: 3.5 G/DL (ref 2–4)
GLUCOSE SERPL-MCNC: 101 MG/DL (ref 70–105)
GLUCOSE SERPL-MCNC: 135 MG/DL (ref 70–105)
GLUCOSE SERPL-MCNC: 186 MG/DL (ref 70–105)
GLUCOSE SERPL-MCNC: 90 MG/DL (ref 74–106)
GLUCOSE SERPL-MCNC: 93 MG/DL (ref 70–105)
HBA1C MFR BLD: 4 % (ref 4–5.6)
HBV CORE AB SERPL QL IA: NEGATIVE
HCT VFR BLD AUTO: 26.6 % (ref 38–47)
HGB BLD-MCNC: 7.9 G/DL (ref 12–16)
IMM GRANULOCYTES # BLD AUTO: 0.04 K/UL (ref 0–0.04)
IMM GRANULOCYTES NFR BLD: 1.3 % (ref 0–0.4)
LYMPHOCYTES # BLD AUTO: 0.72 K/UL (ref 1–4.8)
LYMPHOCYTES NFR BLD AUTO: 23.2 % (ref 27–41)
MAGNESIUM SERPL-MCNC: 2 MG/DL (ref 1.7–2.3)
MCH RBC QN AUTO: 29.7 PG (ref 27–31)
MCHC RBC AUTO-ENTMCNC: 29.7 G/DL (ref 32–36)
MCV RBC AUTO: 100 FL (ref 80–96)
MONOCYTES # BLD AUTO: 0.23 K/UL (ref 0–0.8)
MONOCYTES NFR BLD AUTO: 7.4 % (ref 2–6)
MPC BLD CALC-MCNC: 10.7 FL (ref 9.4–12.4)
NEUTROPHILS # BLD AUTO: 2.07 K/UL (ref 1.8–7.7)
NEUTROPHILS NFR BLD AUTO: 66.8 % (ref 53–65)
NRBC # BLD AUTO: 0 X10E3/UL
NRBC, AUTO (.00): 0 %
PLATELET # BLD AUTO: 50 K/UL (ref 150–400)
POTASSIUM SERPL-SCNC: 3.2 MMOL/L (ref 3.5–5.1)
PROT SERPL-MCNC: 5.9 G/DL (ref 6.4–8.2)
RBC # BLD AUTO: 2.66 M/UL (ref 4.2–5.4)
SODIUM SERPL-SCNC: 138 MMOL/L (ref 136–145)
WBC # BLD AUTO: 3.1 K/UL (ref 4.5–11)

## 2024-05-22 PROCEDURE — 36415 COLL VENOUS BLD VENIPUNCTURE: CPT | Performed by: INTERNAL MEDICINE

## 2024-05-22 PROCEDURE — 99233 SBSQ HOSP IP/OBS HIGH 50: CPT | Mod: ,,, | Performed by: INTERNAL MEDICINE

## 2024-05-22 PROCEDURE — 25000003 PHARM REV CODE 250: Performed by: INTERNAL MEDICINE

## 2024-05-22 PROCEDURE — 25000003 PHARM REV CODE 250: Performed by: HOSPITALIST

## 2024-05-22 PROCEDURE — 83735 ASSAY OF MAGNESIUM: CPT | Performed by: INTERNAL MEDICINE

## 2024-05-22 PROCEDURE — 80053 COMPREHEN METABOLIC PANEL: CPT | Performed by: INTERNAL MEDICINE

## 2024-05-22 PROCEDURE — 85025 COMPLETE CBC W/AUTO DIFF WBC: CPT | Performed by: INTERNAL MEDICINE

## 2024-05-22 PROCEDURE — 63600175 PHARM REV CODE 636 W HCPCS: Performed by: HOSPITALIST

## 2024-05-22 PROCEDURE — 82962 GLUCOSE BLOOD TEST: CPT

## 2024-05-22 PROCEDURE — 99900035 HC TECH TIME PER 15 MIN (STAT)

## 2024-05-22 PROCEDURE — 94761 N-INVAS EAR/PLS OXIMETRY MLT: CPT

## 2024-05-22 PROCEDURE — C9113 INJ PANTOPRAZOLE SODIUM, VIA: HCPCS | Performed by: INTERNAL MEDICINE

## 2024-05-22 PROCEDURE — 21400001 HC TELEMETRY ROOM

## 2024-05-22 PROCEDURE — 63600175 PHARM REV CODE 636 W HCPCS: Performed by: INTERNAL MEDICINE

## 2024-05-22 RX ORDER — ALUMINUM HYDROXIDE, MAGNESIUM HYDROXIDE, AND SIMETHICONE 2400; 240; 2400 MG/30ML; MG/30ML; MG/30ML
15 SUSPENSION ORAL EVERY 6 HOURS PRN
Status: DISCONTINUED | OUTPATIENT
Start: 2024-05-22 | End: 2024-05-23 | Stop reason: HOSPADM

## 2024-05-22 RX ORDER — PROPRANOLOL HYDROCHLORIDE 10 MG/1
20 TABLET ORAL 2 TIMES DAILY
Status: DISCONTINUED | OUTPATIENT
Start: 2024-05-22 | End: 2024-05-23 | Stop reason: HOSPADM

## 2024-05-22 RX ADMIN — SUCRALFATE 1 G: 1 SUSPENSION ORAL at 06:05

## 2024-05-22 RX ADMIN — PROPRANOLOL HYDROCHLORIDE 20 MG: 10 TABLET ORAL at 09:05

## 2024-05-22 RX ADMIN — TRAZODONE HYDROCHLORIDE 50 MG: 50 TABLET ORAL at 11:05

## 2024-05-22 RX ADMIN — SIMETHICONE 80 MG: 80 TABLET, CHEWABLE ORAL at 11:05

## 2024-05-22 RX ADMIN — PANTOPRAZOLE SODIUM 40 MG: 40 INJECTION, POWDER, LYOPHILIZED, FOR SOLUTION INTRAVENOUS at 09:05

## 2024-05-22 RX ADMIN — SUCRALFATE 1 G: 1 SUSPENSION ORAL at 11:05

## 2024-05-22 RX ADMIN — INSULIN ASPART 2 UNITS: 100 INJECTION, SOLUTION INTRAVENOUS; SUBCUTANEOUS at 12:05

## 2024-05-22 RX ADMIN — SUCRALFATE 1 G: 1 SUSPENSION ORAL at 12:05

## 2024-05-22 RX ADMIN — PROPRANOLOL HYDROCHLORIDE 20 MG: 10 TABLET ORAL at 08:05

## 2024-05-22 RX ADMIN — SODIUM CHLORIDE: 9 INJECTION, SOLUTION INTRAVENOUS at 04:05

## 2024-05-22 RX ADMIN — MUPIROCIN: 20 OINTMENT TOPICAL at 09:05

## 2024-05-22 RX ADMIN — MUPIROCIN: 20 OINTMENT TOPICAL at 08:05

## 2024-05-22 RX ADMIN — PANTOPRAZOLE SODIUM 40 MG: 40 INJECTION, POWDER, LYOPHILIZED, FOR SOLUTION INTRAVENOUS at 08:05

## 2024-05-22 NOTE — PLAN OF CARE
Problem: Diabetes Comorbidity  Goal: Blood Glucose Level Within Targeted Range  Outcome: Progressing  Intervention: Monitor and Manage Glycemia  Flowsheets (Taken 5/22/2024 1739)  Glycemic Management: blood glucose monitored     Problem: Fall Injury Risk  Goal: Absence of Fall and Fall-Related Injury  Outcome: Progressing  Intervention: Identify and Manage Contributors  Flowsheets (Taken 5/22/2024 1739)  Self-Care Promotion: independence encouraged  Medication Review/Management: medications reviewed  Intervention: Promote Injury-Free Environment  Flowsheets (Taken 5/22/2024 1739)  Safety Promotion/Fall Prevention:   assistive device/personal item within reach   room near unit station

## 2024-05-22 NOTE — SUBJECTIVE & OBJECTIVE
Interval History/Significant Events:  patient without complaints resting    Review of Systems  Objective:     Vital Signs (Most Recent):  Temp: 98.8 °F (37.1 °C) (05/22/24 0306)  Pulse: 83 (05/22/24 0306)  Resp: 14 (05/22/24 0306)  BP: 120/86 (05/22/24 0306)  SpO2: 97 % (05/22/24 0306) Vital Signs (24h Range):  Temp:  [98.3 °F (36.8 °C)-98.9 °F (37.2 °C)] 98.8 °F (37.1 °C)  Pulse:  [] 83  Resp:  [11-23] 14  SpO2:  [95 %-100 %] 97 %  BP: (117-182)/() 120/86   Weight: 65.5 kg (144 lb 6.4 oz)  Body mass index is 27.28 kg/m².      Intake/Output Summary (Last 24 hours) at 5/22/2024 0608  Last data filed at 5/22/2024 0500  Gross per 24 hour   Intake 1547.92 ml   Output --   Net 1547.92 ml          Physical Exam  Vitals reviewed.   Constitutional:       Appearance: Normal appearance.      Interventions: She is not intubated.  HENT:      Head: Normocephalic and atraumatic.      Nose: Nose normal.      Mouth/Throat:      Mouth: Mucous membranes are dry.      Pharynx: Oropharynx is clear.   Eyes:      Extraocular Movements: Extraocular movements intact.      Conjunctiva/sclera: Conjunctivae normal.      Pupils: Pupils are equal, round, and reactive to light.   Cardiovascular:      Rate and Rhythm: Normal rate.      Heart sounds: Normal heart sounds. No murmur heard.  Pulmonary:      Effort: Pulmonary effort is normal. She is not intubated.      Breath sounds: Normal breath sounds.   Abdominal:      General: Abdomen is flat. Bowel sounds are normal.      Palpations: Abdomen is soft.   Musculoskeletal:         General: Normal range of motion.      Cervical back: Normal range of motion and neck supple.      Right lower leg: No edema.      Left lower leg: No edema.   Skin:     General: Skin is warm and dry.      Capillary Refill: Capillary refill takes less than 2 seconds.   Neurological:      General: No focal deficit present.      Mental Status: She is alert and oriented to person, place, and time.   Psychiatric:          Mood and Affect: Mood normal.         Behavior: Behavior normal.            Vents:     Lines/Drains/Airways       Peripheral Intravenous Line  Duration                  Peripheral IV - Double Lumen 05/19/24 2049 20 G Anterior;Left Forearm 2 days         Peripheral IV - Single Lumen 05/19/24 1901 18 G Right Antecubital 2 days                  Significant Labs:    CBC/Anemia Profile:  Recent Labs   Lab 05/20/24  1212 05/21/24  0450 05/21/24  0639 05/22/24  0441   WBC  --   --  3.64* 3.10*   HGB 8.0*  --  8.3* 7.9*   HCT 27.0*  --  27.2* 26.6*   PLT  --   --  65* 50*   MCV  --   --  96.8* 100.0*   RDW  --   --  21.6* 21.5*   FERRITIN  --  28  --   --    FOLATE  --  9.6  --   --         Chemistries:  Recent Labs   Lab 05/21/24  0639 05/22/24  0441     --    K 3.7  --    *  --    CO2 26  --    BUN 4*  --    CREATININE 0.64  --    CALCIUM 7.6*  --    MG  --  2.0       Recent Lab Results  (Last 5 results in the past 24 hours)        05/22/24  0524   05/22/24  0441   05/21/24  2344   05/21/24  1806   05/21/24  1124        Baso #   0.01             Basophil %   0.3             Differential Method   Auto             Eos #   0.03             Eos %   1.0             Hematocrit   26.6             Hemoglobin   7.9             Immature Grans (Abs)   0.04             Immature Granulocytes   1.3             Lymph #   0.72             Lymph %   23.2             Magnesium    2.0             MCH   29.7             MCHC   29.7             MCV   100.0             Mono #   0.23             Mono %   7.4             MPV   10.7             Neutrophils, Abs   2.07             Neutrophils Relative   66.8             nRBC   0.0             NUCLEATED RBC ABSOLUTE   0.00             Platelet Count   50             POC Glucose 101     93   96   198       RBC   2.66             RDW   21.5             WBC   3.10                                    Significant Imaging:  I have reviewed all pertinent imaging results/findings within the  past 24 hours.

## 2024-05-22 NOTE — PLAN OF CARE
Problem: Adult Inpatient Plan of Care  Goal: Plan of Care Review  Outcome: Progressing  Goal: Patient-Specific Goal (Individualized)  Outcome: Progressing  Goal: Absence of Hospital-Acquired Illness or Injury  Outcome: Progressing  Goal: Optimal Comfort and Wellbeing  Outcome: Progressing  Goal: Readiness for Transition of Care  Outcome: Progressing     Problem: Diabetes Comorbidity  Goal: Blood Glucose Level Within Targeted Range  Outcome: Progressing  Intervention: Monitor and Manage Glycemia  Flowsheets (Taken 5/21/2024 1936)  Glycemic Management: blood glucose monitored     Problem: Fall Injury Risk  Goal: Absence of Fall and Fall-Related Injury  Outcome: Progressing  Intervention: Identify and Manage Contributors  Flowsheets (Taken 5/21/2024 1936)  Medication Review/Management: medications reviewed  Intervention: Promote Injury-Free Environment  Flowsheets (Taken 5/21/2024 1936)  Safety Promotion/Fall Prevention:   assistive device/personal item within reach   instructed to call staff for mobility

## 2024-05-22 NOTE — PROGRESS NOTES
[facility]  Critical Care Medicine  Progress Note    Patient Name: Elizabeth Greenwood  MRN: [id number]  Admission Date: 5/19/2024  Hospital Length of Stay: 3 days  Code Status: Full Code  Attending Provider: Glen Yousif MD  Primary Care Provider: [facility] -   Principal Problem: UGIB (upper gastrointestinal bleed)    Subjective:     HPI:   38-year-old female from [facility] transferred for hematemesis.  She yesterday had nauseated do up several times had abdominal pain periumbilical pain then she started coughing up coffee-ground emesis.  Then there was some bright red blood.  She no longer drinks.  This morning she is resting comfortable without complaints    Hospital/ICU Course:  No notes on file    Interval History/Significant Events:  patient without complaints resting    Review of Systems  Objective:     Vital Signs (Most Recent):  Temp: 98.8 °F (37.1 °C) (05/22/24 0306)  Pulse: 83 (05/22/24 0306)  Resp: 14 (05/22/24 0306)  BP: 120/86 (05/22/24 0306)  SpO2: 97 % (05/22/24 0306) Vital Signs (24h Range):  Temp:  [98.3 °F (36.8 °C)-98.9 °F (37.2 °C)] 98.8 °F (37.1 °C)  Pulse:  [] 83  Resp:  [11-23] 14  SpO2:  [95 %-100 %] 97 %  BP: (117-182)/() 120/86   Weight: 65.5 kg (144 lb 6.4 oz)  Body mass index is 27.28 kg/m².      Intake/Output Summary (Last 24 hours) at 5/22/2024 0608  Last data filed at 5/22/2024 0500  Gross per 24 hour   Intake 1547.92 ml   Output --   Net 1547.92 ml          Physical Exam  Vitals reviewed.   Constitutional:       Appearance: Normal appearance.      Interventions: She is not intubated.  HENT:      Head: Normocephalic and atraumatic.      Nose: Nose normal.      Mouth/Throat:      Mouth: Mucous membranes are dry.      Pharynx: Oropharynx is clear.   Eyes:      Extraocular Movements: Extraocular movements intact.      Conjunctiva/sclera: Conjunctivae normal.      Pupils: Pupils are equal, round, and reactive to light.    Cardiovascular:      Rate and Rhythm: Normal rate.      Heart sounds: Normal heart sounds. No murmur heard.  Pulmonary:      Effort: Pulmonary effort is normal. She is not intubated.      Breath sounds: Normal breath sounds.   Abdominal:      General: Abdomen is flat. Bowel sounds are normal.      Palpations: Abdomen is soft.   Musculoskeletal:         General: Normal range of motion.      Cervical back: Normal range of motion and neck supple.      Right lower leg: No edema.      Left lower leg: No edema.   Skin:     General: Skin is warm and dry.      Capillary Refill: Capillary refill takes less than 2 seconds.   Neurological:      General: No focal deficit present.      Mental Status: She is alert and oriented to person, place, and time.   Psychiatric:         Mood and Affect: Mood normal.         Behavior: Behavior normal.            Vents:     Lines/Drains/Airways       Peripheral Intravenous Line  Duration                  Peripheral IV - Double Lumen 05/19/24 2049 20 G Anterior;Left Forearm 2 days         Peripheral IV - Single Lumen 05/19/24 1901 18 G Right Antecubital 2 days                  Significant Labs:    CBC/Anemia Profile:  Recent Labs   Lab 05/20/24  1212 05/21/24  0450 05/21/24  0639 05/22/24  0441   WBC  --   --  3.64* 3.10*   HGB 8.0*  --  8.3* 7.9*   HCT 27.0*  --  27.2* 26.6*   PLT  --   --  65* 50*   MCV  --   --  96.8* 100.0*   RDW  --   --  21.6* 21.5*   FERRITIN  --  28  --   --    FOLATE  --  9.6  --   --         Chemistries:  Recent Labs   Lab 05/21/24  0639 05/22/24  0441     --    K 3.7  --    *  --    CO2 26  --    BUN 4*  --    CREATININE 0.64  --    CALCIUM 7.6*  --    MG  --  2.0       Recent Lab Results  (Last 5 results in the past 24 hours)        05/22/24  0524   05/22/24  0441   05/21/24  2344   05/21/24  1806   05/21/24  1124        Baso #   0.01             Basophil %   0.3             Differential Method   Auto             Eos #   0.03             Eos %    "1.0             Hematocrit   26.6             Hemoglobin   7.9             Immature Grans (Abs)   0.04             Immature Granulocytes   1.3             Lymph #   0.72             Lymph %   23.2             Magnesium    2.0             MCH   29.7             MCHC   29.7             MCV   100.0             Mono #   0.23             Mono %   7.4             MPV   10.7             Neutrophils, Abs   2.07             Neutrophils Relative   66.8             nRBC   0.0             NUCLEATED RBC ABSOLUTE   0.00             Platelet Count   50             POC Glucose 101     93   96   198       RBC   2.66             RDW   21.5             WBC   3.10                                    Significant Imaging:  I have reviewed all pertinent imaging results/findings within the past 24 hours.    ABG  No results for input(s): "PH", "PO2", "PCO2", "HCO3", "BE" in the last 168 hours.  Assessment/Plan:     Oncology  Pancytopenia   Currently no change possible bone marrow biopsy at some point    Endocrine  Type 2 diabetes mellitus   Sugars under good control sliding scale    GI  * UGIB (upper gastrointestinal bleed)   Hematocrit has remained stable no active bleeding    Elevated liver function tests   Defer to  Gi on his issue so far the only positive test was an NIO but with negative DNA    Other  Nicotine dependence   noted             Glen Yousif MD  Critical Care Medicine  [facility]  "

## 2024-05-23 VITALS
WEIGHT: 146.19 LBS | SYSTOLIC BLOOD PRESSURE: 128 MMHG | BODY MASS INDEX: 27.6 KG/M2 | DIASTOLIC BLOOD PRESSURE: 84 MMHG | TEMPERATURE: 98 F | RESPIRATION RATE: 15 BRPM | HEIGHT: 61 IN | OXYGEN SATURATION: 95 % | HEART RATE: 82 BPM

## 2024-05-23 PROBLEM — I85.01 BLEEDING ESOPHAGEAL VARICES: Status: ACTIVE | Noted: 2024-05-23

## 2024-05-23 PROBLEM — K92.0 HEMATEMESIS: Status: ACTIVE | Noted: 2024-05-19

## 2024-05-23 LAB
ANTI-MITOCHONDRIAL (M2) AB INDEX: 0.07
GLUCOSE SERPL-MCNC: 126 MG/DL (ref 70–105)
GLUCOSE SERPL-MCNC: 158 MG/DL (ref 70–105)
HGB FRACT BLD ELPH-IMP: NEGATIVE
SMOOTH MUSCLE AB SER QL IF: NEGATIVE

## 2024-05-23 PROCEDURE — 82962 GLUCOSE BLOOD TEST: CPT

## 2024-05-23 PROCEDURE — C9113 INJ PANTOPRAZOLE SODIUM, VIA: HCPCS | Performed by: INTERNAL MEDICINE

## 2024-05-23 PROCEDURE — 25000003 PHARM REV CODE 250: Performed by: HOSPITALIST

## 2024-05-23 PROCEDURE — 99239 HOSP IP/OBS DSCHRG MGMT >30: CPT | Mod: ,,, | Performed by: INTERNAL MEDICINE

## 2024-05-23 PROCEDURE — 63600175 PHARM REV CODE 636 W HCPCS: Performed by: HOSPITALIST

## 2024-05-23 PROCEDURE — 63600175 PHARM REV CODE 636 W HCPCS: Performed by: INTERNAL MEDICINE

## 2024-05-23 PROCEDURE — S4991 NICOTINE PATCH NONLEGEND: HCPCS | Performed by: HOSPITALIST

## 2024-05-23 PROCEDURE — 25000003 PHARM REV CODE 250: Performed by: INTERNAL MEDICINE

## 2024-05-23 RX ORDER — IBUPROFEN 200 MG
1 TABLET ORAL DAILY
Qty: 4 PATCH | Refills: 0 | Status: SHIPPED | OUTPATIENT
Start: 2024-05-23

## 2024-05-23 RX ORDER — PROPRANOLOL HYDROCHLORIDE 20 MG/1
20 TABLET ORAL 2 TIMES DAILY
Qty: 60 TABLET | Refills: 11 | Status: SHIPPED | OUTPATIENT
Start: 2024-05-23 | End: 2025-05-23

## 2024-05-23 RX ORDER — PANTOPRAZOLE SODIUM 40 MG/1
40 TABLET, DELAYED RELEASE ORAL DAILY
Qty: 90 TABLET | Refills: 3 | Status: SHIPPED | OUTPATIENT
Start: 2024-05-23 | End: 2025-05-23

## 2024-05-23 RX ADMIN — PROPRANOLOL HYDROCHLORIDE 20 MG: 10 TABLET ORAL at 08:05

## 2024-05-23 RX ADMIN — MUPIROCIN: 20 OINTMENT TOPICAL at 08:05

## 2024-05-23 RX ADMIN — NICOTINE 1 PATCH: 14 PATCH, EXTENDED RELEASE TRANSDERMAL at 08:05

## 2024-05-23 RX ADMIN — SUCRALFATE 1 G: 1 SUSPENSION ORAL at 05:05

## 2024-05-23 RX ADMIN — PANTOPRAZOLE SODIUM 40 MG: 40 INJECTION, POWDER, LYOPHILIZED, FOR SOLUTION INTRAVENOUS at 08:05

## 2024-05-23 RX ADMIN — INSULIN ASPART 1 UNITS: 100 INJECTION, SOLUTION INTRAVENOUS; SUBCUTANEOUS at 12:05

## 2024-05-23 RX ADMIN — Medication 6 MG: at 12:05

## 2024-05-23 NOTE — HOSPITAL COURSE
Patient was admitted with bleeding esophageal varices secondary to cirrhosis of the liver thought to be secondary to alcohol.  Patient underwent banding with 4 bands no more bleeding her somatostatin infusion has been stopped still no bleeding normal or stable blood count.  Hemoglobin currently 7.9 platelet count [id number] secondary to her liver disease currently has normal iron stores normal B12 and folate.  Patient also has diabetes controlled with metformin she was ready for discharge folic [facility] in 1 week

## 2024-05-23 NOTE — DISCHARGE SUMMARY
[facility]  Critical Care Medicine  Discharge Summary      Patient Name: Elizabeth Greenwood  MRN: [id number]  Admission Date: 5/19/2024  Hospital Length of Stay: 4 days  Discharge Date and Time:  05/23/2024 6:12 AM  Attending Physician: Glen Yousif MD   Discharging Provider: Glen Yousif MD  Primary Care Provider: [facility], [facility] -  Reason for Admission:  esophageal variceal bleed and thrombocytopenia and cirrhosis    HPI:    38-year-old female from [facility] transferred for hematemesis.  She yesterday had nauseated do up several times had abdominal pain periumbilical pain then she started coughing up coffee-ground emesis.  Then there was some bright red blood.  She no longer drinks.  This morning she is resting comfortable without complaints    * No surgery found *    Indwelling Lines/Drains at Time of Discharge:   Lines/Drains/Airways       None                 Hospital Course:    Patient was admitted with bleeding esophageal varices secondary to cirrhosis of the liver thought to be secondary to alcohol.  Patient underwent banding with 4 bands no more bleeding her somatostatin infusion has been stopped still no bleeding normal or stable blood count.  Hemoglobin currently 7.9 platelet count [id number] secondary to her liver disease currently has normal iron stores normal B12 and folate.  Patient also has diabetes controlled with metformin she was ready for discharge [facility] in 1 week    Consults (From admission, onward)          Status Ordering Provider     Inpatient consult to Hematology/Oncology  Once        Provider:  Ladarius Valdez MD    Acknowledged GLEN YOUSIF     Inpatient consult to Gastroenterology  Once        Provider:  Ladarius Valdez MD    Completed AMARIS DAVIS          Significant Labs:  Recent Lab Results         05/23/24  0529   05/22/24  2358   05/22/24  1703   05/22/24  1154        POC Glucose 126   158    135   186               Significant Imaging:  I have reviewed all pertinent imaging results/findings within the past 24 hours.    Pending Diagnostic Studies:       Procedure Component Value Units Date/Time    Anti-Smooth Muscle Antibody [[id number]] Collected: 05/21/24 0449    Order Status: Sent Lab Status: In process Updated: 05/21/24 0541    Specimen: Blood     Antimitochondrial Antibody [[id number]] Collected: 05/21/24 0450    Order Status: Sent Lab Status: In process Updated: 05/21/24 0541    Specimen: Blood     EXTRA TUBES [[id number]] Collected: 05/21/24 0450    Order Status: Sent Lab Status: In process Updated: 05/21/24 0543    Specimen: Blood, Venous     Narrative:      The following orders were created for panel order EXTRA TUBES.  Procedure                               Abnormality         Status                     ---------                               -----------         ------                     Lavender Top Hold[[id number]]                               In process                   Please view results for these tests on the individual orders.    EXTRA TUBES [[id number]] Collected: 05/20/24 0326    Order Status: Sent Lab Status: In process Updated: 05/20/24 0332    Specimen: Blood, Venous     Narrative:      The following orders were created for panel order EXTRA TUBES.  Procedure                               Abnormality         Status                     ---------                               -----------         ------                     Red Top Hold[[id number]]                                    In process                   Please view results for these tests on the individual orders.          Final Active Diagnoses:    Diagnosis Date Noted POA    PRINCIPAL PROBLEM:  Hematemesis [K92.0] 05/19/2024 Yes    Bleeding esophageal varices [I85.01] 05/23/2024 Yes    Elevated liver function tests [R79.89] 05/20/2024 Yes    Pancytopenia [D61.818] 05/19/2024 Yes    Type 2 diabetes mellitus [E11.9]  Yes     Nicotine dependence [F17.200]  Yes      Problems Resolved During this Admission:     Oncology  Pancytopenia   Anemic and thrombocytopenic white counts okay    Endocrine  Type 2 diabetes mellitus   Continue metformin at home    GI  * Hematemesis   Resolved stable hematocrit    Bleeding esophageal varices   Status post for bands sent home on propranolol    Elevated liver function tests    Looks like this is secondary to alcohol use in his cirrhosis    Other  Nicotine dependence   Referral to smoking cessation and nicotine patch      Discharged Condition: fair    Disposition: Home or Self Care     Follow-up Information       [facility], [facility] - Follow up in 1 week(s).    Why: CBC and CMP  Contact information:  [address]  [facility] [address]  [phone]                           Patient Instructions:      Ambulatory referral/consult to Smoking Cessation Program   Standing Status: Future   Referral Priority: Routine Referral Type: Consultation   Referral Reason: Specialty Services Required   Requested Specialty: CTTS   Number of Visits Requested: 1     EGD   Standing Status: Future Standing Exp. Date: 05/20/25   Order Comments: Recheck varices     Order Specific Question Answer Comments   Location for procedure [facility]    Where to place procedure? ENDO    What is the patient's sedation requirement? Anesthesia    CPT Code: NM EGD, FLEX, DIAGNOSTIC [32112]      Medications:  Reconciled Home Medications:      Medication List        START taking these medications      * nicotine 14 mg/24 hr  Commonly known as: NICODERM CQ  Place 1 patch onto the skin once daily.     * nicotine 14 mg/24 hr  Commonly known as: NICODERM CQ  Place 1 patch onto the skin once daily.     pantoprazole 40 MG tablet  Commonly known as: PROTONIX  Take 1 tablet (40 mg total) by mouth once daily.     propranoloL 20 MG tablet  Commonly known as: INDERAL  Take 1 tablet (20 mg total) by mouth 2 (two) times daily.           * This list has 2  medication(s) that are the same as other medications prescribed for you. Read the directions carefully, and ask your doctor or other care provider to review them with you.                CONTINUE taking these medications      metFORMIN 500 MG tablet  Commonly known as: GLUCOPHAGE  Take 500 mg by mouth 2 (two) times daily with meals.           Spent 35 minutes in discharge     Glen Yousif MD  Critical Care Medicine  [facility]

## 2024-05-23 NOTE — PLAN OF CARE
[facility]  Discharge Final Note    Primary Care Provider: [facility] -    Expected Discharge Date: 5/23/2024    Final Discharge Note (most recent)       Final Note - 05/23/24 1157          Final Note    Assessment Type Final Discharge Note     Anticipated Discharge Disposition Home or Self Care                     Important Message from Medicare             Contact Info       [facility]   Relationship: PCP - General    [address] [facility] [address]   Phone: [phone]       Next Steps: Follow up in 1 week(s)    Instructions: CBC and CMP    Serene Mariee FNP   Specialty: Gastroenterology, Emergency Medicine    [address] [place] [address]   Phone: [phone]       Next Steps: Follow up in 3 month(s)    Ladarius Valdez MD   Specialty: Gastroenterology    [address] [place] [address]   Phone: [phone]       Next Steps: Follow up in 1 month(s)    Instructions: EGD          Pt discharged home with  no needs.